# Patient Record
Sex: MALE | Race: WHITE | NOT HISPANIC OR LATINO | URBAN - METROPOLITAN AREA
[De-identification: names, ages, dates, MRNs, and addresses within clinical notes are randomized per-mention and may not be internally consistent; named-entity substitution may affect disease eponyms.]

---

## 2017-01-02 ENCOUNTER — COMMUNICATION - HEALTHEAST (OUTPATIENT)
Dept: FAMILY MEDICINE | Facility: CLINIC | Age: 57
End: 2017-01-02

## 2017-01-03 ENCOUNTER — OFFICE VISIT - HEALTHEAST (OUTPATIENT)
Dept: FAMILY MEDICINE | Facility: CLINIC | Age: 57
End: 2017-01-03

## 2017-01-03 DIAGNOSIS — I50.32 CHRONIC DIASTOLIC CONGESTIVE HEART FAILURE (H): ICD-10-CM

## 2017-01-03 DIAGNOSIS — I10 ESSENTIAL HYPERTENSION WITH GOAL BLOOD PRESSURE LESS THAN 140/90: ICD-10-CM

## 2017-01-03 ASSESSMENT — MIFFLIN-ST. JEOR: SCORE: 1930.3

## 2017-01-17 ENCOUNTER — OFFICE VISIT - HEALTHEAST (OUTPATIENT)
Dept: CARDIOLOGY | Facility: CLINIC | Age: 57
End: 2017-01-17

## 2017-01-17 ENCOUNTER — AMBULATORY - HEALTHEAST (OUTPATIENT)
Dept: CARDIOLOGY | Facility: CLINIC | Age: 57
End: 2017-01-17

## 2017-01-17 ENCOUNTER — OFFICE VISIT - HEALTHEAST (OUTPATIENT)
Dept: PULMONOLOGY | Facility: OTHER | Age: 57
End: 2017-01-17

## 2017-01-17 DIAGNOSIS — I50.32 CHRONIC DIASTOLIC CONGESTIVE HEART FAILURE (H): ICD-10-CM

## 2017-01-17 DIAGNOSIS — G47.30 SLEEP-DISORDERED BREATHING: ICD-10-CM

## 2017-01-17 DIAGNOSIS — I27.20 PULMONARY HYPERTENSION (H): ICD-10-CM

## 2017-01-17 RX ORDER — FUROSEMIDE 40 MG
40 TABLET ORAL
Qty: 180 TABLET | Refills: 3 | Status: SHIPPED | OUTPATIENT
Start: 2017-01-17 | End: 2017-04-17

## 2017-01-17 ASSESSMENT — MIFFLIN-ST. JEOR
SCORE: 1930.3
SCORE: 1921.23

## 2017-01-20 ENCOUNTER — AMBULATORY - HEALTHEAST (OUTPATIENT)
Dept: SLEEP MEDICINE | Facility: CLINIC | Age: 57
End: 2017-01-20

## 2017-02-12 ENCOUNTER — RECORDS - HEALTHEAST (OUTPATIENT)
Dept: ADMINISTRATIVE | Facility: OTHER | Age: 57
End: 2017-02-12

## 2017-02-12 ENCOUNTER — RECORDS - HEALTHEAST (OUTPATIENT)
Dept: SLEEP MEDICINE | Facility: CLINIC | Age: 57
End: 2017-02-12

## 2017-02-12 DIAGNOSIS — G47.30 SLEEP APNEA, UNSPECIFIED: ICD-10-CM

## 2017-02-13 ENCOUNTER — OFFICE VISIT - HEALTHEAST (OUTPATIENT)
Dept: CARDIOLOGY | Facility: CLINIC | Age: 57
End: 2017-02-13

## 2017-02-13 DIAGNOSIS — I50.32 CHRONIC DIASTOLIC CONGESTIVE HEART FAILURE (H): ICD-10-CM

## 2017-02-13 DIAGNOSIS — I27.20 PULMONARY HYPERTENSION (H): ICD-10-CM

## 2017-02-13 DIAGNOSIS — E11.9 TYPE 2 DIABETES MELLITUS (H): ICD-10-CM

## 2017-02-13 DIAGNOSIS — F17.200 TOBACCO USE DISORDER: ICD-10-CM

## 2017-02-13 DIAGNOSIS — I10 ESSENTIAL HYPERTENSION WITH GOAL BLOOD PRESSURE LESS THAN 140/90: ICD-10-CM

## 2017-02-13 DIAGNOSIS — J43.9 PULMONARY EMPHYSEMA, UNSPECIFIED EMPHYSEMA TYPE (H): ICD-10-CM

## 2017-02-13 DIAGNOSIS — E11.9 DIABETES (H): ICD-10-CM

## 2017-02-13 ASSESSMENT — MIFFLIN-ST. JEOR: SCORE: 1925.76

## 2017-02-27 ENCOUNTER — COMMUNICATION - HEALTHEAST (OUTPATIENT)
Dept: SCHEDULING | Facility: CLINIC | Age: 57
End: 2017-02-27

## 2017-02-28 ENCOUNTER — OFFICE VISIT - HEALTHEAST (OUTPATIENT)
Dept: PULMONOLOGY | Facility: OTHER | Age: 57
End: 2017-02-28

## 2017-02-28 DIAGNOSIS — I50.32 CHRONIC DIASTOLIC CONGESTIVE HEART FAILURE (H): ICD-10-CM

## 2017-02-28 DIAGNOSIS — J43.9 PULMONARY EMPHYSEMA, UNSPECIFIED EMPHYSEMA TYPE (H): ICD-10-CM

## 2017-02-28 DIAGNOSIS — I27.20 PULMONARY HYPERTENSION (H): ICD-10-CM

## 2017-02-28 DIAGNOSIS — J96.11 CHRONIC RESPIRATORY FAILURE WITH HYPOXIA (H): ICD-10-CM

## 2017-02-28 DIAGNOSIS — G47.33 OSA (OBSTRUCTIVE SLEEP APNEA): ICD-10-CM

## 2017-03-02 ENCOUNTER — COMMUNICATION - HEALTHEAST (OUTPATIENT)
Dept: FAMILY MEDICINE | Facility: CLINIC | Age: 57
End: 2017-03-02

## 2017-03-02 DIAGNOSIS — J44.89 CHRONIC AIRWAY OBSTRUCTION, NOT ELSEWHERE CLASSIFIED: ICD-10-CM

## 2017-03-06 ENCOUNTER — COMMUNICATION - HEALTHEAST (OUTPATIENT)
Dept: FAMILY MEDICINE | Facility: CLINIC | Age: 57
End: 2017-03-06

## 2017-03-06 DIAGNOSIS — E11.9 TYPE 2 DIABETES MELLITUS (H): ICD-10-CM

## 2017-03-09 ENCOUNTER — AMBULATORY - HEALTHEAST (OUTPATIENT)
Dept: PULMONOLOGY | Facility: OTHER | Age: 57
End: 2017-03-09

## 2017-03-09 ENCOUNTER — COMMUNICATION - HEALTHEAST (OUTPATIENT)
Dept: PULMONOLOGY | Facility: OTHER | Age: 57
End: 2017-03-09

## 2017-03-09 DIAGNOSIS — G47.33 OSA (OBSTRUCTIVE SLEEP APNEA): ICD-10-CM

## 2017-03-15 ENCOUNTER — COMMUNICATION - HEALTHEAST (OUTPATIENT)
Dept: PULMONOLOGY | Facility: OTHER | Age: 57
End: 2017-03-15

## 2017-03-16 ENCOUNTER — COMMUNICATION - HEALTHEAST (OUTPATIENT)
Dept: PULMONOLOGY | Facility: OTHER | Age: 57
End: 2017-03-16

## 2017-03-16 DIAGNOSIS — J43.9 PULMONARY EMPHYSEMA, UNSPECIFIED EMPHYSEMA TYPE (H): ICD-10-CM

## 2017-03-17 ENCOUNTER — OFFICE VISIT - HEALTHEAST (OUTPATIENT)
Dept: CARDIOLOGY | Facility: CLINIC | Age: 57
End: 2017-03-17

## 2017-03-17 DIAGNOSIS — I50.32 CHRONIC DIASTOLIC CONGESTIVE HEART FAILURE (H): ICD-10-CM

## 2017-03-17 ASSESSMENT — MIFFLIN-ST. JEOR: SCORE: 1930.3

## 2017-03-23 ENCOUNTER — AMBULATORY - HEALTHEAST (OUTPATIENT)
Dept: PULMONOLOGY | Facility: OTHER | Age: 57
End: 2017-03-23

## 2017-04-02 ENCOUNTER — RECORDS - HEALTHEAST (OUTPATIENT)
Dept: SLEEP MEDICINE | Age: 57
End: 2017-04-02

## 2017-04-02 DIAGNOSIS — G47.33 OBSTRUCTIVE SLEEP APNEA (ADULT) (PEDIATRIC): ICD-10-CM

## 2017-04-04 ENCOUNTER — AMBULATORY - HEALTHEAST (OUTPATIENT)
Dept: PULMONOLOGY | Facility: OTHER | Age: 57
End: 2017-04-04

## 2017-04-04 DIAGNOSIS — J43.9 EMPHYSEMA LUNG (H): ICD-10-CM

## 2017-04-18 ENCOUNTER — RECORDS - HEALTHEAST (OUTPATIENT)
Dept: ADMINISTRATIVE | Facility: OTHER | Age: 57
End: 2017-04-18

## 2017-04-18 ENCOUNTER — RECORDS - HEALTHEAST (OUTPATIENT)
Dept: PULMONOLOGY | Facility: OTHER | Age: 57
End: 2017-04-18

## 2017-04-18 ENCOUNTER — OFFICE VISIT - HEALTHEAST (OUTPATIENT)
Dept: PULMONOLOGY | Facility: OTHER | Age: 57
End: 2017-04-18

## 2017-04-18 DIAGNOSIS — Z72.0 TOBACCO USE: ICD-10-CM

## 2017-04-18 DIAGNOSIS — J43.9 PULMONARY EMPHYSEMA, UNSPECIFIED EMPHYSEMA TYPE (H): ICD-10-CM

## 2017-04-18 DIAGNOSIS — J96.11 CHRONIC RESPIRATORY FAILURE WITH HYPOXIA (H): ICD-10-CM

## 2017-04-18 DIAGNOSIS — J43.9 EMPHYSEMA, UNSPECIFIED (H): ICD-10-CM

## 2017-04-18 DIAGNOSIS — G47.33 OBSTRUCTIVE SLEEP APNEA: ICD-10-CM

## 2017-04-26 ENCOUNTER — AMBULATORY - HEALTHEAST (OUTPATIENT)
Dept: PULMONOLOGY | Facility: OTHER | Age: 57
End: 2017-04-26

## 2017-04-28 ENCOUNTER — OFFICE VISIT - HEALTHEAST (OUTPATIENT)
Dept: FAMILY MEDICINE | Facility: CLINIC | Age: 57
End: 2017-04-28

## 2017-04-28 DIAGNOSIS — G89.29 CHRONIC LEFT SHOULDER PAIN: ICD-10-CM

## 2017-04-28 DIAGNOSIS — E11.9 TYPE 2 DIABETES MELLITUS WITHOUT COMPLICATION, WITHOUT LONG-TERM CURRENT USE OF INSULIN (H): ICD-10-CM

## 2017-04-28 DIAGNOSIS — J43.9 PULMONARY EMPHYSEMA, UNSPECIFIED EMPHYSEMA TYPE (H): ICD-10-CM

## 2017-04-28 DIAGNOSIS — M25.512 CHRONIC LEFT SHOULDER PAIN: ICD-10-CM

## 2017-04-28 LAB — HBA1C MFR BLD: 5.5 % (ref 3.5–6)

## 2017-04-28 ASSESSMENT — MIFFLIN-ST. JEOR: SCORE: 1900.82

## 2017-05-04 ENCOUNTER — HOSPITAL ENCOUNTER (OUTPATIENT)
Dept: MRI IMAGING | Facility: HOSPITAL | Age: 57
Discharge: HOME | End: 2017-05-04
Attending: FAMILY MEDICINE

## 2017-05-04 ENCOUNTER — COMMUNICATION - HEALTHEAST (OUTPATIENT)
Dept: FAMILY MEDICINE | Facility: CLINIC | Age: 57
End: 2017-05-04

## 2017-05-04 DIAGNOSIS — G89.29 CHRONIC LEFT SHOULDER PAIN: ICD-10-CM

## 2017-05-04 DIAGNOSIS — J43.9 PULMONARY EMPHYSEMA, UNSPECIFIED EMPHYSEMA TYPE (H): ICD-10-CM

## 2017-05-04 DIAGNOSIS — M25.512 CHRONIC LEFT SHOULDER PAIN: ICD-10-CM

## 2017-05-16 ENCOUNTER — OFFICE VISIT - HEALTHEAST (OUTPATIENT)
Dept: FAMILY MEDICINE | Facility: CLINIC | Age: 57
End: 2017-05-16

## 2017-05-16 ASSESSMENT — MIFFLIN-ST. JEOR: SCORE: 1906.49

## 2017-05-22 ENCOUNTER — COMMUNICATION - HEALTHEAST (OUTPATIENT)
Dept: FAMILY MEDICINE | Facility: CLINIC | Age: 57
End: 2017-05-22

## 2017-05-22 DIAGNOSIS — J44.89 CHRONIC AIRWAY OBSTRUCTION, NOT ELSEWHERE CLASSIFIED: ICD-10-CM

## 2017-06-05 ENCOUNTER — RECORDS - HEALTHEAST (OUTPATIENT)
Dept: ADMINISTRATIVE | Facility: OTHER | Age: 57
End: 2017-06-05

## 2017-07-02 ENCOUNTER — COMMUNICATION - HEALTHEAST (OUTPATIENT)
Dept: FAMILY MEDICINE | Facility: CLINIC | Age: 57
End: 2017-07-02

## 2017-07-02 DIAGNOSIS — J44.89 CHRONIC AIRWAY OBSTRUCTION, NOT ELSEWHERE CLASSIFIED: ICD-10-CM

## 2017-07-03 ENCOUNTER — RECORDS - HEALTHEAST (OUTPATIENT)
Dept: ADMINISTRATIVE | Facility: OTHER | Age: 57
End: 2017-07-03

## 2017-08-10 ENCOUNTER — OFFICE VISIT - HEALTHEAST (OUTPATIENT)
Dept: PULMONOLOGY | Facility: OTHER | Age: 57
End: 2017-08-10

## 2017-08-10 DIAGNOSIS — G47.33 OBSTRUCTIVE SLEEP APNEA: ICD-10-CM

## 2017-08-10 DIAGNOSIS — J43.9 PULMONARY EMPHYSEMA, UNSPECIFIED EMPHYSEMA TYPE (H): ICD-10-CM

## 2017-08-10 DIAGNOSIS — R06.09 DYSPNEA ON EXERTION: ICD-10-CM

## 2017-08-10 DIAGNOSIS — J96.11 CHRONIC RESPIRATORY FAILURE WITH HYPOXIA (H): ICD-10-CM

## 2017-08-10 DIAGNOSIS — I50.32 CHRONIC DIASTOLIC CONGESTIVE HEART FAILURE (H): ICD-10-CM

## 2017-09-25 ENCOUNTER — COMMUNICATION - HEALTHEAST (OUTPATIENT)
Dept: FAMILY MEDICINE | Facility: CLINIC | Age: 57
End: 2017-09-25

## 2017-09-25 DIAGNOSIS — J44.89 CHRONIC AIRWAY OBSTRUCTION, NOT ELSEWHERE CLASSIFIED: ICD-10-CM

## 2017-10-17 ENCOUNTER — OFFICE VISIT - HEALTHEAST (OUTPATIENT)
Dept: FAMILY MEDICINE | Facility: CLINIC | Age: 57
End: 2017-10-17

## 2017-10-17 DIAGNOSIS — E11.9 TYPE 2 DIABETES MELLITUS WITHOUT COMPLICATION, WITHOUT LONG-TERM CURRENT USE OF INSULIN (H): ICD-10-CM

## 2017-10-17 DIAGNOSIS — I10 ESSENTIAL HYPERTENSION WITH GOAL BLOOD PRESSURE LESS THAN 140/90: ICD-10-CM

## 2017-10-17 DIAGNOSIS — Z23 NEED FOR IMMUNIZATION AGAINST INFLUENZA: ICD-10-CM

## 2017-10-17 LAB
CHOLEST SERPL-MCNC: 193 MG/DL
FASTING STATUS PATIENT QL REPORTED: YES
HBA1C MFR BLD: 5.9 % (ref 3.5–6)
HDLC SERPL-MCNC: 45 MG/DL
LDLC SERPL CALC-MCNC: 115 MG/DL
TRIGL SERPL-MCNC: 163 MG/DL

## 2017-10-17 ASSESSMENT — MIFFLIN-ST. JEOR: SCORE: 1901.95

## 2017-11-06 ENCOUNTER — COMMUNICATION - HEALTHEAST (OUTPATIENT)
Dept: FAMILY MEDICINE | Facility: CLINIC | Age: 57
End: 2017-11-06

## 2017-11-13 ENCOUNTER — COMMUNICATION - HEALTHEAST (OUTPATIENT)
Dept: FAMILY MEDICINE | Facility: CLINIC | Age: 57
End: 2017-11-13

## 2017-11-13 DIAGNOSIS — E11.9 TYPE 2 DIABETES MELLITUS (H): ICD-10-CM

## 2017-11-28 ENCOUNTER — OFFICE VISIT - HEALTHEAST (OUTPATIENT)
Dept: CARDIOLOGY | Facility: CLINIC | Age: 57
End: 2017-11-28

## 2017-11-28 DIAGNOSIS — J96.11 CHRONIC RESPIRATORY FAILURE WITH HYPOXIA (H): ICD-10-CM

## 2017-11-28 DIAGNOSIS — E11.9 TYPE 2 DIABETES MELLITUS WITHOUT COMPLICATION, WITHOUT LONG-TERM CURRENT USE OF INSULIN (H): ICD-10-CM

## 2017-11-28 DIAGNOSIS — J43.9 PULMONARY EMPHYSEMA, UNSPECIFIED EMPHYSEMA TYPE (H): ICD-10-CM

## 2017-11-28 DIAGNOSIS — I10 ESSENTIAL HYPERTENSION WITH GOAL BLOOD PRESSURE LESS THAN 140/90: ICD-10-CM

## 2017-11-28 DIAGNOSIS — G47.33 OBSTRUCTIVE SLEEP APNEA: ICD-10-CM

## 2017-11-28 DIAGNOSIS — I27.20 PULMONARY HYPERTENSION (H): ICD-10-CM

## 2017-11-28 DIAGNOSIS — I50.32 CHRONIC DIASTOLIC CONGESTIVE HEART FAILURE (H): ICD-10-CM

## 2017-11-28 ASSESSMENT — MIFFLIN-ST. JEOR: SCORE: 1862.26

## 2017-12-26 ENCOUNTER — COMMUNICATION - HEALTHEAST (OUTPATIENT)
Dept: FAMILY MEDICINE | Facility: CLINIC | Age: 57
End: 2017-12-26

## 2018-01-02 ENCOUNTER — RECORDS - HEALTHEAST (OUTPATIENT)
Dept: ADMINISTRATIVE | Facility: OTHER | Age: 58
End: 2018-01-02

## 2018-01-08 ENCOUNTER — COMMUNICATION - HEALTHEAST (OUTPATIENT)
Dept: CARDIOLOGY | Facility: CLINIC | Age: 58
End: 2018-01-08

## 2018-01-08 DIAGNOSIS — I50.32 CHRONIC DIASTOLIC CONGESTIVE HEART FAILURE (H): ICD-10-CM

## 2018-01-09 ENCOUNTER — RECORDS - HEALTHEAST (OUTPATIENT)
Dept: ADMINISTRATIVE | Facility: OTHER | Age: 58
End: 2018-01-09

## 2018-01-28 ENCOUNTER — COMMUNICATION - HEALTHEAST (OUTPATIENT)
Dept: FAMILY MEDICINE | Facility: CLINIC | Age: 58
End: 2018-01-28

## 2018-01-28 ENCOUNTER — COMMUNICATION - HEALTHEAST (OUTPATIENT)
Dept: PULMONOLOGY | Facility: OTHER | Age: 58
End: 2018-01-28

## 2018-01-28 DIAGNOSIS — I50.32 CHRONIC DIASTOLIC CONGESTIVE HEART FAILURE (H): ICD-10-CM

## 2018-01-28 DIAGNOSIS — I10 ESSENTIAL HYPERTENSION WITH GOAL BLOOD PRESSURE LESS THAN 140/90: ICD-10-CM

## 2018-02-27 ENCOUNTER — AMBULATORY - HEALTHEAST (OUTPATIENT)
Dept: PULMONOLOGY | Facility: OTHER | Age: 58
End: 2018-02-27

## 2018-02-27 ENCOUNTER — OFFICE VISIT - HEALTHEAST (OUTPATIENT)
Dept: PULMONOLOGY | Facility: OTHER | Age: 58
End: 2018-02-27

## 2018-02-27 DIAGNOSIS — J43.9 PULMONARY EMPHYSEMA, UNSPECIFIED EMPHYSEMA TYPE (H): ICD-10-CM

## 2018-02-27 DIAGNOSIS — G47.33 OBSTRUCTIVE SLEEP APNEA: ICD-10-CM

## 2018-02-27 DIAGNOSIS — J96.11 CHRONIC RESPIRATORY FAILURE WITH HYPOXIA (H): ICD-10-CM

## 2018-02-27 DIAGNOSIS — I27.20 PULMONARY HYPERTENSION (H): ICD-10-CM

## 2018-04-09 ENCOUNTER — COMMUNICATION - HEALTHEAST (OUTPATIENT)
Dept: FAMILY MEDICINE | Facility: CLINIC | Age: 58
End: 2018-04-09

## 2018-04-17 ENCOUNTER — OFFICE VISIT - HEALTHEAST (OUTPATIENT)
Dept: FAMILY MEDICINE | Facility: CLINIC | Age: 58
End: 2018-04-17

## 2018-04-17 DIAGNOSIS — E78.2 MIXED HYPERLIPIDEMIA: ICD-10-CM

## 2018-04-17 DIAGNOSIS — E11.9 TYPE 2 DIABETES MELLITUS WITHOUT COMPLICATION, WITHOUT LONG-TERM CURRENT USE OF INSULIN (H): ICD-10-CM

## 2018-04-17 LAB
HBA1C MFR BLD: 6 % (ref 3.5–6)
LDLC SERPL CALC-MCNC: 109 MG/DL

## 2018-04-17 ASSESSMENT — MIFFLIN-ST. JEOR: SCORE: 1857.73

## 2018-04-30 ENCOUNTER — AMBULATORY - HEALTHEAST (OUTPATIENT)
Dept: PULMONOLOGY | Facility: OTHER | Age: 58
End: 2018-04-30

## 2018-05-29 ENCOUNTER — COMMUNICATION - HEALTHEAST (OUTPATIENT)
Dept: FAMILY MEDICINE | Facility: CLINIC | Age: 58
End: 2018-05-29

## 2018-08-09 ENCOUNTER — COMMUNICATION - HEALTHEAST (OUTPATIENT)
Dept: ADMINISTRATIVE | Facility: CLINIC | Age: 58
End: 2018-08-09

## 2018-09-02 ENCOUNTER — COMMUNICATION - HEALTHEAST (OUTPATIENT)
Dept: PULMONOLOGY | Facility: OTHER | Age: 58
End: 2018-09-02

## 2018-09-02 DIAGNOSIS — J43.9 PULMONARY EMPHYSEMA, UNSPECIFIED EMPHYSEMA TYPE (H): ICD-10-CM

## 2018-10-19 ENCOUNTER — OFFICE VISIT - HEALTHEAST (OUTPATIENT)
Dept: FAMILY MEDICINE | Facility: CLINIC | Age: 58
End: 2018-10-19

## 2018-10-19 DIAGNOSIS — E11.9 TYPE 2 DIABETES MELLITUS WITHOUT COMPLICATION, WITHOUT LONG-TERM CURRENT USE OF INSULIN (H): ICD-10-CM

## 2018-10-19 DIAGNOSIS — Z23 NEED FOR VACCINATION: ICD-10-CM

## 2018-10-19 DIAGNOSIS — I10 ESSENTIAL HYPERTENSION WITH GOAL BLOOD PRESSURE LESS THAN 140/90: ICD-10-CM

## 2018-10-19 DIAGNOSIS — Z12.11 SCREEN FOR COLON CANCER: ICD-10-CM

## 2018-10-19 DIAGNOSIS — E78.2 MIXED HYPERLIPIDEMIA: ICD-10-CM

## 2018-10-19 LAB
ALBUMIN SERPL-MCNC: 4.3 G/DL (ref 3.5–5)
ALP SERPL-CCNC: 116 U/L (ref 45–120)
ALT SERPL W P-5'-P-CCNC: 17 U/L (ref 0–45)
ANION GAP SERPL CALCULATED.3IONS-SCNC: 11 MMOL/L (ref 5–18)
AST SERPL W P-5'-P-CCNC: 20 U/L (ref 0–40)
BILIRUB SERPL-MCNC: 1.2 MG/DL (ref 0–1)
BUN SERPL-MCNC: 18 MG/DL (ref 8–22)
CALCIUM SERPL-MCNC: 9.6 MG/DL (ref 8.5–10.5)
CHLORIDE BLD-SCNC: 93 MMOL/L (ref 98–107)
CHOLEST SERPL-MCNC: 169 MG/DL
CO2 SERPL-SCNC: 37 MMOL/L (ref 22–31)
CREAT SERPL-MCNC: 0.74 MG/DL (ref 0.7–1.3)
CREAT UR-MCNC: 141.1 MG/DL
FASTING STATUS PATIENT QL REPORTED: YES
GFR SERPL CREATININE-BSD FRML MDRD: >60 ML/MIN/1.73M2
GLUCOSE BLD-MCNC: 102 MG/DL (ref 70–125)
HBA1C MFR BLD: 5.9 % (ref 3.5–6)
HDLC SERPL-MCNC: 42 MG/DL
LDLC SERPL CALC-MCNC: 107 MG/DL
MICROALBUMIN UR-MCNC: 0.82 MG/DL (ref 0–1.99)
MICROALBUMIN/CREAT UR: 5.8 MG/G
POTASSIUM BLD-SCNC: 4.2 MMOL/L (ref 3.5–5)
PROT SERPL-MCNC: 7.6 G/DL (ref 6–8)
SODIUM SERPL-SCNC: 141 MMOL/L (ref 136–145)
TRIGL SERPL-MCNC: 98 MG/DL

## 2018-10-19 ASSESSMENT — MIFFLIN-ST. JEOR: SCORE: 1832.78

## 2018-10-26 ENCOUNTER — RECORDS - HEALTHEAST (OUTPATIENT)
Dept: ADMINISTRATIVE | Facility: OTHER | Age: 58
End: 2018-10-26

## 2018-11-08 ENCOUNTER — RECORDS - HEALTHEAST (OUTPATIENT)
Dept: ADMINISTRATIVE | Facility: OTHER | Age: 58
End: 2018-11-08

## 2018-11-08 LAB — COLOGUARD-ABSTRACT: NEGATIVE

## 2018-11-12 ENCOUNTER — COMMUNICATION - HEALTHEAST (OUTPATIENT)
Dept: FAMILY MEDICINE | Facility: CLINIC | Age: 58
End: 2018-11-12

## 2018-11-14 ENCOUNTER — COMMUNICATION - HEALTHEAST (OUTPATIENT)
Dept: FAMILY MEDICINE | Facility: CLINIC | Age: 58
End: 2018-11-14

## 2018-11-15 ENCOUNTER — COMMUNICATION - HEALTHEAST (OUTPATIENT)
Dept: SCHEDULING | Facility: CLINIC | Age: 58
End: 2018-11-15

## 2019-01-08 ENCOUNTER — RECORDS - HEALTHEAST (OUTPATIENT)
Dept: ADMINISTRATIVE | Facility: OTHER | Age: 59
End: 2019-01-08

## 2019-01-13 ENCOUNTER — COMMUNICATION - HEALTHEAST (OUTPATIENT)
Dept: CARDIOLOGY | Facility: CLINIC | Age: 59
End: 2019-01-13

## 2019-01-13 DIAGNOSIS — I50.32 CHRONIC DIASTOLIC CONGESTIVE HEART FAILURE (H): ICD-10-CM

## 2019-01-18 ENCOUNTER — COMMUNICATION - HEALTHEAST (OUTPATIENT)
Dept: PULMONOLOGY | Facility: OTHER | Age: 59
End: 2019-01-18

## 2019-01-18 DIAGNOSIS — J43.9 PULMONARY EMPHYSEMA, UNSPECIFIED EMPHYSEMA TYPE (H): ICD-10-CM

## 2019-01-21 ENCOUNTER — COMMUNICATION - HEALTHEAST (OUTPATIENT)
Dept: FAMILY MEDICINE | Facility: CLINIC | Age: 59
End: 2019-01-21

## 2019-01-21 DIAGNOSIS — I10 ESSENTIAL HYPERTENSION WITH GOAL BLOOD PRESSURE LESS THAN 140/90: ICD-10-CM

## 2019-02-12 ENCOUNTER — COMMUNICATION - HEALTHEAST (OUTPATIENT)
Dept: PULMONOLOGY | Facility: OTHER | Age: 59
End: 2019-02-12

## 2019-02-15 ENCOUNTER — COMMUNICATION - HEALTHEAST (OUTPATIENT)
Dept: FAMILY MEDICINE | Facility: CLINIC | Age: 59
End: 2019-02-15

## 2019-02-20 ENCOUNTER — AMBULATORY - HEALTHEAST (OUTPATIENT)
Dept: FAMILY MEDICINE | Facility: CLINIC | Age: 59
End: 2019-02-20

## 2019-04-04 ENCOUNTER — RECORDS - HEALTHEAST (OUTPATIENT)
Dept: ADMINISTRATIVE | Facility: OTHER | Age: 59
End: 2019-04-04

## 2019-04-15 ENCOUNTER — COMMUNICATION - HEALTHEAST (OUTPATIENT)
Dept: FAMILY MEDICINE | Facility: CLINIC | Age: 59
End: 2019-04-15

## 2019-04-19 ENCOUNTER — OFFICE VISIT - HEALTHEAST (OUTPATIENT)
Dept: FAMILY MEDICINE | Facility: CLINIC | Age: 59
End: 2019-04-19

## 2019-04-19 DIAGNOSIS — E11.9 TYPE 2 DIABETES MELLITUS WITHOUT COMPLICATION, WITHOUT LONG-TERM CURRENT USE OF INSULIN (H): ICD-10-CM

## 2019-04-19 LAB — HBA1C MFR BLD: 5.7 % (ref 3.5–6)

## 2019-04-19 ASSESSMENT — MIFFLIN-ST. JEOR: SCORE: 1841.85

## 2019-04-24 ENCOUNTER — RECORDS - HEALTHEAST (OUTPATIENT)
Dept: HEALTH INFORMATION MANAGEMENT | Facility: CLINIC | Age: 59
End: 2019-04-24

## 2019-05-01 ENCOUNTER — AMBULATORY - HEALTHEAST (OUTPATIENT)
Dept: OTHER | Facility: CLINIC | Age: 59
End: 2019-05-01

## 2019-05-02 ENCOUNTER — RECORDS - HEALTHEAST (OUTPATIENT)
Dept: HEALTH INFORMATION MANAGEMENT | Facility: CLINIC | Age: 59
End: 2019-05-02

## 2019-06-20 ENCOUNTER — COMMUNICATION - HEALTHEAST (OUTPATIENT)
Dept: FAMILY MEDICINE | Facility: CLINIC | Age: 59
End: 2019-06-20

## 2019-06-21 ENCOUNTER — OFFICE VISIT - HEALTHEAST (OUTPATIENT)
Dept: PHARMACY | Facility: CLINIC | Age: 59
End: 2019-06-21

## 2019-06-21 DIAGNOSIS — I10 ESSENTIAL HYPERTENSION WITH GOAL BLOOD PRESSURE LESS THAN 140/90: ICD-10-CM

## 2019-06-21 DIAGNOSIS — I50.32 CHRONIC DIASTOLIC CONGESTIVE HEART FAILURE (H): ICD-10-CM

## 2019-06-21 DIAGNOSIS — E11.9 TYPE 2 DIABETES MELLITUS WITHOUT COMPLICATION, WITHOUT LONG-TERM CURRENT USE OF INSULIN (H): ICD-10-CM

## 2019-06-21 DIAGNOSIS — J43.9 PULMONARY EMPHYSEMA, UNSPECIFIED EMPHYSEMA TYPE (H): ICD-10-CM

## 2019-07-11 ENCOUNTER — COMMUNICATION - HEALTHEAST (OUTPATIENT)
Dept: CARDIOLOGY | Facility: CLINIC | Age: 59
End: 2019-07-11

## 2019-07-11 ENCOUNTER — AMBULATORY - HEALTHEAST (OUTPATIENT)
Dept: FAMILY MEDICINE | Facility: CLINIC | Age: 59
End: 2019-07-11

## 2019-07-11 DIAGNOSIS — I50.32 CHRONIC DIASTOLIC CONGESTIVE HEART FAILURE (H): ICD-10-CM

## 2019-08-06 ENCOUNTER — OFFICE VISIT - HEALTHEAST (OUTPATIENT)
Dept: PHARMACY | Facility: CLINIC | Age: 59
End: 2019-08-06

## 2019-08-06 DIAGNOSIS — I10 ESSENTIAL HYPERTENSION WITH GOAL BLOOD PRESSURE LESS THAN 140/90: ICD-10-CM

## 2019-08-06 DIAGNOSIS — E11.9 TYPE 2 DIABETES MELLITUS WITHOUT COMPLICATION, WITHOUT LONG-TERM CURRENT USE OF INSULIN (H): ICD-10-CM

## 2019-08-06 DIAGNOSIS — J43.9 PULMONARY EMPHYSEMA, UNSPECIFIED EMPHYSEMA TYPE (H): ICD-10-CM

## 2019-08-06 DIAGNOSIS — I50.32 CHRONIC DIASTOLIC CONGESTIVE HEART FAILURE (H): ICD-10-CM

## 2019-09-05 ENCOUNTER — HOME CARE/HOSPICE - HEALTHEAST (OUTPATIENT)
Dept: HOME HEALTH SERVICES | Facility: HOME HEALTH | Age: 59
End: 2019-09-05

## 2019-09-06 ENCOUNTER — COMMUNICATION - HEALTHEAST (OUTPATIENT)
Dept: CARE COORDINATION | Facility: CLINIC | Age: 59
End: 2019-09-06

## 2019-09-08 ENCOUNTER — COMMUNICATION - HEALTHEAST (OUTPATIENT)
Dept: HOME HEALTH SERVICES | Facility: HOME HEALTH | Age: 59
End: 2019-09-08

## 2019-09-09 ENCOUNTER — OFFICE VISIT - HEALTHEAST (OUTPATIENT)
Dept: FAMILY MEDICINE | Facility: CLINIC | Age: 59
End: 2019-09-09

## 2019-09-09 DIAGNOSIS — E11.9 TYPE 2 DIABETES MELLITUS WITHOUT COMPLICATION, WITHOUT LONG-TERM CURRENT USE OF INSULIN (H): ICD-10-CM

## 2019-09-09 DIAGNOSIS — J96.21 ACUTE ON CHRONIC RESPIRATORY FAILURE WITH HYPOXIA AND HYPERCAPNIA (H): ICD-10-CM

## 2019-09-09 DIAGNOSIS — J96.22 ACUTE ON CHRONIC RESPIRATORY FAILURE WITH HYPOXIA AND HYPERCAPNIA (H): ICD-10-CM

## 2019-09-09 ASSESSMENT — MIFFLIN-ST. JEOR: SCORE: 1770.41

## 2019-09-12 ENCOUNTER — COMMUNICATION - HEALTHEAST (OUTPATIENT)
Dept: FAMILY MEDICINE | Facility: CLINIC | Age: 59
End: 2019-09-12

## 2019-10-24 ENCOUNTER — COMMUNICATION - HEALTHEAST (OUTPATIENT)
Dept: FAMILY MEDICINE | Facility: CLINIC | Age: 59
End: 2019-10-24

## 2019-10-24 DIAGNOSIS — I10 ESSENTIAL HYPERTENSION WITH GOAL BLOOD PRESSURE LESS THAN 140/90: ICD-10-CM

## 2019-11-06 ENCOUNTER — COMMUNICATION - HEALTHEAST (OUTPATIENT)
Dept: FAMILY MEDICINE | Facility: CLINIC | Age: 59
End: 2019-11-06

## 2019-11-07 ENCOUNTER — OFFICE VISIT - HEALTHEAST (OUTPATIENT)
Dept: PULMONOLOGY | Facility: OTHER | Age: 59
End: 2019-11-07

## 2019-11-07 DIAGNOSIS — R06.02 SHORTNESS OF BREATH: ICD-10-CM

## 2019-11-07 DIAGNOSIS — J43.2 CENTRILOBULAR EMPHYSEMA (H): ICD-10-CM

## 2019-11-07 DIAGNOSIS — G47.33 OSA (OBSTRUCTIVE SLEEP APNEA): ICD-10-CM

## 2019-11-07 DIAGNOSIS — J96.11 CHRONIC RESPIRATORY FAILURE WITH HYPOXIA (H): ICD-10-CM

## 2019-11-07 LAB — BNP SERPL-MCNC: <10 PG/ML (ref 0–49)

## 2019-11-07 ASSESSMENT — MIFFLIN-ST. JEOR: SCORE: 1794.22

## 2019-11-08 ENCOUNTER — AMBULATORY - HEALTHEAST (OUTPATIENT)
Dept: PULMONOLOGY | Facility: OTHER | Age: 59
End: 2019-11-08

## 2019-11-11 ENCOUNTER — AMBULATORY - HEALTHEAST (OUTPATIENT)
Dept: PULMONOLOGY | Facility: OTHER | Age: 59
End: 2019-11-11

## 2019-11-19 ENCOUNTER — OFFICE VISIT - HEALTHEAST (OUTPATIENT)
Dept: PHARMACY | Facility: CLINIC | Age: 59
End: 2019-11-19

## 2019-11-19 ENCOUNTER — AMBULATORY - HEALTHEAST (OUTPATIENT)
Dept: NURSING | Facility: CLINIC | Age: 59
End: 2019-11-19

## 2019-11-19 DIAGNOSIS — I50.32 CHRONIC DIASTOLIC CONGESTIVE HEART FAILURE (H): ICD-10-CM

## 2019-11-19 DIAGNOSIS — I10 ESSENTIAL HYPERTENSION WITH GOAL BLOOD PRESSURE LESS THAN 140/90: ICD-10-CM

## 2019-11-19 DIAGNOSIS — E11.9 TYPE 2 DIABETES MELLITUS WITHOUT COMPLICATION, WITHOUT LONG-TERM CURRENT USE OF INSULIN (H): ICD-10-CM

## 2019-11-19 DIAGNOSIS — Z23 IMMUNIZATION DUE: ICD-10-CM

## 2019-11-19 DIAGNOSIS — J43.9 PULMONARY EMPHYSEMA, UNSPECIFIED EMPHYSEMA TYPE (H): ICD-10-CM

## 2019-11-24 ENCOUNTER — COMMUNICATION - HEALTHEAST (OUTPATIENT)
Dept: PULMONOLOGY | Facility: OTHER | Age: 59
End: 2019-11-24

## 2019-11-24 DIAGNOSIS — J43.9 PULMONARY EMPHYSEMA, UNSPECIFIED EMPHYSEMA TYPE (H): ICD-10-CM

## 2019-12-13 ENCOUNTER — COMMUNICATION - HEALTHEAST (OUTPATIENT)
Dept: FAMILY MEDICINE | Facility: CLINIC | Age: 59
End: 2019-12-13

## 2019-12-13 DIAGNOSIS — J43.9 PULMONARY EMPHYSEMA, UNSPECIFIED EMPHYSEMA TYPE (H): ICD-10-CM

## 2020-01-01 ENCOUNTER — COMMUNICATION - HEALTHEAST (OUTPATIENT)
Dept: CARDIOLOGY | Facility: CLINIC | Age: 60
End: 2020-01-01

## 2020-01-01 ENCOUNTER — AMBULATORY - HEALTHEAST (OUTPATIENT)
Dept: OTHER | Facility: CLINIC | Age: 60
End: 2020-01-01

## 2020-01-01 ENCOUNTER — AMBULATORY - HEALTHEAST (OUTPATIENT)
Dept: PULMONOLOGY | Facility: OTHER | Age: 60
End: 2020-01-01

## 2020-01-01 ENCOUNTER — COMMUNICATION - HEALTHEAST (OUTPATIENT)
Dept: FAMILY MEDICINE | Facility: CLINIC | Age: 60
End: 2020-01-01

## 2020-01-01 ENCOUNTER — OFFICE VISIT - HEALTHEAST (OUTPATIENT)
Dept: PULMONOLOGY | Facility: OTHER | Age: 60
End: 2020-01-01

## 2020-01-01 ENCOUNTER — OFFICE VISIT - HEALTHEAST (OUTPATIENT)
Dept: FAMILY MEDICINE | Facility: CLINIC | Age: 60
End: 2020-01-01

## 2020-01-01 ENCOUNTER — RECORDS - HEALTHEAST (OUTPATIENT)
Dept: ADMINISTRATIVE | Facility: OTHER | Age: 60
End: 2020-01-01

## 2020-01-01 ENCOUNTER — COMMUNICATION - HEALTHEAST (OUTPATIENT)
Dept: PULMONOLOGY | Facility: OTHER | Age: 60
End: 2020-01-01

## 2020-01-01 ENCOUNTER — AMBULATORY - HEALTHEAST (OUTPATIENT)
Dept: PHARMACY | Facility: CLINIC | Age: 60
End: 2020-01-01

## 2020-01-01 ENCOUNTER — SURGERY - HEALTHEAST (OUTPATIENT)
Dept: CARDIOLOGY | Facility: CLINIC | Age: 60
End: 2020-01-01

## 2020-01-01 DIAGNOSIS — I10 ESSENTIAL HYPERTENSION WITH GOAL BLOOD PRESSURE LESS THAN 140/90: ICD-10-CM

## 2020-01-01 DIAGNOSIS — F17.200 TOBACCO USE DISORDER: ICD-10-CM

## 2020-01-01 DIAGNOSIS — E11.9 TYPE 2 DIABETES MELLITUS WITHOUT COMPLICATION, WITHOUT LONG-TERM CURRENT USE OF INSULIN (H): ICD-10-CM

## 2020-01-01 DIAGNOSIS — R09.02 HYPOXIA: ICD-10-CM

## 2020-01-01 DIAGNOSIS — I50.32 CHRONIC DIASTOLIC CONGESTIVE HEART FAILURE (H): ICD-10-CM

## 2020-01-01 DIAGNOSIS — J96.12 CHRONIC RESPIRATORY FAILURE WITH HYPOXIA AND HYPERCAPNIA (H): ICD-10-CM

## 2020-01-01 DIAGNOSIS — J43.9 PULMONARY EMPHYSEMA, UNSPECIFIED EMPHYSEMA TYPE (H): ICD-10-CM

## 2020-01-01 DIAGNOSIS — Z71.89 ADVANCED DIRECTIVES, COUNSELING/DISCUSSION: ICD-10-CM

## 2020-01-01 DIAGNOSIS — I50.32 CHRONIC DIASTOLIC HEART FAILURE WITH PRESERVED EJECTION FRACTION (H): ICD-10-CM

## 2020-01-01 DIAGNOSIS — J44.9 COPD (CHRONIC OBSTRUCTIVE PULMONARY DISEASE) (H): ICD-10-CM

## 2020-01-01 DIAGNOSIS — G47.33 OSA (OBSTRUCTIVE SLEEP APNEA): ICD-10-CM

## 2020-01-01 DIAGNOSIS — J96.11 CHRONIC RESPIRATORY FAILURE WITH HYPOXIA AND HYPERCAPNIA (H): ICD-10-CM

## 2020-01-01 DIAGNOSIS — E78.2 MIXED HYPERLIPIDEMIA: ICD-10-CM

## 2020-01-01 LAB
CHOLEST SERPL-MCNC: 165 MG/DL
CREAT UR-MCNC: 208 MG/DL
FASTING STATUS PATIENT QL REPORTED: YES
HBA1C MFR BLD: 5.6 % (ref 3.5–6)
HBA1C MFR BLD: 5.9 %
HDLC SERPL-MCNC: 45 MG/DL
LDLC SERPL CALC-MCNC: 108 MG/DL
MICROALBUMIN UR-MCNC: 1.47 MG/DL (ref 0–1.99)
MICROALBUMIN/CREAT UR: 7.1 MG/G
TRIGL SERPL-MCNC: 62 MG/DL

## 2020-01-01 RX ORDER — VARENICLINE TARTRATE 1 MG/1
TABLET, FILM COATED ORAL
Qty: 60 TABLET | Refills: 2 | Status: SHIPPED | OUTPATIENT
Start: 2020-01-01

## 2020-01-01 RX ORDER — AZITHROMYCIN 250 MG/1
TABLET, FILM COATED ORAL
Qty: 6 TABLET | Refills: 3 | Status: SHIPPED | OUTPATIENT
Start: 2020-01-01

## 2020-01-01 RX ORDER — FUROSEMIDE 40 MG
TABLET ORAL
Qty: 180 TABLET | Refills: 1 | Status: SHIPPED | OUTPATIENT
Start: 2020-01-01

## 2020-01-01 RX ORDER — ALBUTEROL SULFATE 90 UG/1
2 AEROSOL, METERED RESPIRATORY (INHALATION) EVERY 4 HOURS PRN
Qty: 6.7 G | Refills: 11 | Status: SHIPPED | OUTPATIENT
Start: 2020-01-01

## 2020-01-01 RX ORDER — PREDNISONE 20 MG/1
20 TABLET ORAL DAILY
Qty: 7 TABLET | Refills: 3 | Status: SHIPPED | OUTPATIENT
Start: 2020-01-01

## 2020-01-01 RX ORDER — METOPROLOL SUCCINATE 25 MG/1
TABLET, EXTENDED RELEASE ORAL
Qty: 90 TABLET | Refills: 2 | Status: SHIPPED | OUTPATIENT
Start: 2020-01-01

## 2020-01-01 RX ORDER — LOSARTAN POTASSIUM 25 MG/1
TABLET ORAL
Qty: 90 TABLET | Refills: 1 | Status: SHIPPED | OUTPATIENT
Start: 2020-01-01

## 2020-01-01 ASSESSMENT — MIFFLIN-ST. JEOR
SCORE: 1754.53
SCORE: 1721.65
SCORE: 1698.97
SCORE: 1726.18
SCORE: 1736.64

## 2020-01-14 ENCOUNTER — RECORDS - HEALTHEAST (OUTPATIENT)
Dept: ADMINISTRATIVE | Facility: OTHER | Age: 60
End: 2020-01-14

## 2020-01-14 LAB — RETINOPATHY: NEGATIVE

## 2020-01-20 ENCOUNTER — RECORDS - HEALTHEAST (OUTPATIENT)
Dept: HEALTH INFORMATION MANAGEMENT | Facility: CLINIC | Age: 60
End: 2020-01-20

## 2020-02-14 ENCOUNTER — OFFICE VISIT - HEALTHEAST (OUTPATIENT)
Dept: PULMONOLOGY | Facility: OTHER | Age: 60
End: 2020-02-14

## 2020-02-14 DIAGNOSIS — J43.9 PULMONARY EMPHYSEMA, UNSPECIFIED EMPHYSEMA TYPE (H): ICD-10-CM

## 2020-02-14 DIAGNOSIS — F17.210 CIGARETTE NICOTINE DEPENDENCE WITHOUT COMPLICATION: ICD-10-CM

## 2020-02-14 DIAGNOSIS — I27.20 PULMONARY HYPERTENSION (H): ICD-10-CM

## 2020-02-14 DIAGNOSIS — G47.33 OBSTRUCTIVE SLEEP APNEA: ICD-10-CM

## 2020-02-14 ASSESSMENT — MIFFLIN-ST. JEOR: SCORE: 1767.01

## 2020-03-02 ENCOUNTER — COMMUNICATION - HEALTHEAST (OUTPATIENT)
Dept: FAMILY MEDICINE | Facility: CLINIC | Age: 60
End: 2020-03-02

## 2020-03-02 DIAGNOSIS — I50.32 CHRONIC DIASTOLIC CONGESTIVE HEART FAILURE (H): ICD-10-CM

## 2021-01-01 ENCOUNTER — RECORDS - HEALTHEAST (OUTPATIENT)
Dept: ADMINISTRATIVE | Facility: OTHER | Age: 61
End: 2021-01-01

## 2021-01-01 ENCOUNTER — AMBULATORY - HEALTHEAST (OUTPATIENT)
Dept: FAMILY MEDICINE | Facility: CLINIC | Age: 61
End: 2021-01-01

## 2021-01-01 ENCOUNTER — AMBULATORY - HEALTHEAST (OUTPATIENT)
Dept: SURGERY | Facility: HOSPITAL | Age: 61
End: 2021-01-01

## 2021-01-01 ENCOUNTER — COMMUNICATION - HEALTHEAST (OUTPATIENT)
Dept: ADMINISTRATIVE | Facility: CLINIC | Age: 61
End: 2021-01-01

## 2021-01-01 ENCOUNTER — HOSPITAL ENCOUNTER (OUTPATIENT)
Dept: PET IMAGING | Facility: HOSPITAL | Age: 61
Discharge: HOME | End: 2021-02-18
Attending: INTERNAL MEDICINE

## 2021-01-01 ENCOUNTER — ANESTHESIA - HEALTHEAST (OUTPATIENT)
Dept: SURGERY | Facility: CLINIC | Age: 61
End: 2021-01-01

## 2021-01-01 ENCOUNTER — COMMUNICATION - HEALTHEAST (OUTPATIENT)
Dept: PULMONOLOGY | Facility: OTHER | Age: 61
End: 2021-01-01

## 2021-01-01 ENCOUNTER — COMMUNICATION - HEALTHEAST (OUTPATIENT)
Dept: FAMILY MEDICINE | Facility: CLINIC | Age: 61
End: 2021-01-01

## 2021-01-01 ENCOUNTER — OFFICE VISIT - HEALTHEAST (OUTPATIENT)
Dept: FAMILY MEDICINE | Facility: CLINIC | Age: 61
End: 2021-01-01

## 2021-01-01 ENCOUNTER — AMBULATORY - HEALTHEAST (OUTPATIENT)
Dept: PULMONOLOGY | Facility: OTHER | Age: 61
End: 2021-01-01

## 2021-01-01 ENCOUNTER — RECORDS - HEALTHEAST (OUTPATIENT)
Dept: RADIOLOGY | Facility: CLINIC | Age: 61
End: 2021-01-01

## 2021-01-01 ENCOUNTER — RECORDS - HEALTHEAST (OUTPATIENT)
Dept: HEALTH INFORMATION MANAGEMENT | Facility: CLINIC | Age: 61
End: 2021-01-01

## 2021-01-01 ENCOUNTER — COMMUNICATION - HEALTHEAST (OUTPATIENT)
Dept: SCHEDULING | Facility: CLINIC | Age: 61
End: 2021-01-01

## 2021-01-01 ENCOUNTER — OFFICE VISIT - HEALTHEAST (OUTPATIENT)
Dept: PULMONOLOGY | Facility: OTHER | Age: 61
End: 2021-01-01

## 2021-01-01 DIAGNOSIS — J96.11 CHRONIC RESPIRATORY FAILURE WITH HYPOXIA AND HYPERCAPNIA (H): ICD-10-CM

## 2021-01-01 DIAGNOSIS — J43.9 PULMONARY EMPHYSEMA, UNSPECIFIED EMPHYSEMA TYPE (H): ICD-10-CM

## 2021-01-01 DIAGNOSIS — I50.32 CHRONIC DIASTOLIC HEART FAILURE WITH PRESERVED EJECTION FRACTION (H): ICD-10-CM

## 2021-01-01 DIAGNOSIS — R59.0 MEDIASTINAL ADENOPATHY: ICD-10-CM

## 2021-01-01 DIAGNOSIS — J96.12 CHRONIC RESPIRATORY FAILURE WITH HYPOXIA AND HYPERCAPNIA (H): ICD-10-CM

## 2021-01-01 DIAGNOSIS — Z11.59 ENCOUNTER FOR SCREENING FOR OTHER VIRAL DISEASES: ICD-10-CM

## 2021-01-01 DIAGNOSIS — E11.9 TYPE 2 DIABETES MELLITUS WITHOUT COMPLICATION, WITHOUT LONG-TERM CURRENT USE OF INSULIN (H): ICD-10-CM

## 2021-01-01 DIAGNOSIS — R91.8 LUNG MASS: ICD-10-CM

## 2021-01-01 DIAGNOSIS — R59.0 MEDIASTINAL LYMPHADENOPATHY: ICD-10-CM

## 2021-01-01 LAB
GLUCOSE BLDC GLUCOMTR-MCNC: 135 MG/DL (ref 70–139)
RETINOPATHY: NEGATIVE
SARS-COV-2 PCR COMMENT: NORMAL
SARS-COV-2 RNA SPEC QL NAA+PROBE: NEGATIVE
SARS-COV-2 VIRUS SPECIMEN SOURCE: NORMAL

## 2021-01-01 RX ORDER — NICOTINE 21 MG/24HR
PATCH, TRANSDERMAL 24 HOURS TRANSDERMAL PRN
Status: SHIPPED | COMMUNITY
Start: 2021-01-01

## 2021-01-01 RX ORDER — TIOTROPIUM BROMIDE AND OLODATEROL 3.124; 2.736 UG/1; UG/1
SPRAY, METERED RESPIRATORY (INHALATION)
Qty: 4 G | Refills: 11 | Status: SHIPPED | OUTPATIENT
Start: 2021-01-01

## 2021-01-01 RX ORDER — ALBUTEROL SULFATE 0.83 MG/ML
SOLUTION RESPIRATORY (INHALATION)
Qty: 300 ML | Refills: 0 | Status: SHIPPED | OUTPATIENT
Start: 2021-01-01

## 2021-01-01 ASSESSMENT — MIFFLIN-ST. JEOR: SCORE: 1678.56

## 2021-04-27 ENCOUNTER — RECORDS - HEALTHEAST (OUTPATIENT)
Dept: ADMINISTRATIVE | Facility: OTHER | Age: 61
End: 2021-04-27

## 2021-05-28 ENCOUNTER — RECORDS - HEALTHEAST (OUTPATIENT)
Dept: ADMINISTRATIVE | Facility: CLINIC | Age: 61
End: 2021-05-28

## 2021-05-28 NOTE — PROGRESS NOTES
Assessment: /    Plan:    1. Type 2 diabetes mellitus without complication, without long-term current use of insulin (H)  Glycosylated Hemoglobin A1c   2. HCD (health care directive)         He brought a copy of his healthcare directive.  He would want CPR.  He states that if he were brain-dead, machines should not be used on a permanent basis.    LDL cholesterol is a little above goal of 100, and he prefers to not add a medication at this time.    Cologuard was negative.    Recheck diabetes in 6 months.      Subjective:    HPI:  Marlon Reddy is a 58-year-old male presenting for follow-up on diabetes.  He takes metformin.    LDL cholesterol was 107.      Social Hx: He is considering applying for Social Security disability because of his respiratory status.    Review of Systems: No fever or chest pain.      Current Outpatient Medications   Medication Sig Dispense Refill     albuterol (PROAIR HFA;PROVENTIL HFA;VENTOLIN HFA) 90 mcg/actuation inhaler Inhale 2 puffs every 4 (four) hours as needed for wheezing. 6.7 g 0     albuterol (PROVENTIL) 2.5 mg /3 mL (0.083 %) nebulizer solution USE 3 ML (2.5 MG TOTAL) IN NEBULIZER EVERY 4 HOURS AS NEEDED FOR WHEEZING 270 mL 11     aspirin 81 mg TbEF Take 81 mg by mouth once daily.        BLOOD SUGAR DIAGNOSTIC (TRUETEST TEST STRIPS MISC) USE 1 STRIP TWICE DAILY       furosemide (LASIX) 40 MG tablet Take 1 tablet (40 mg total) by mouth 2 (two) times a day. 60 tablet 11     INCRUSE ELLIPTA 62.5 mcg/actuation DsDv inhaler INHALE ONE PUFF BY MOUTH ONCE DAILY 30 each 6     losartan (COZAAR) 25 MG tablet Take 1 tablet (25 mg total) by mouth daily. 90 tablet 2     metFORMIN (GLUCOPHAGE) 500 MG tablet TAKE ONE TABLET BY MOUTH TWICE DAILY WITH MEALS (Patient taking differently: TAKE ONE TABLET BY MOUTH DAILY WITH MEALS) 180 tablet 1     metoprolol succinate (TOPROL-XL) 25 MG Take 1 tablet (25 mg total) by mouth daily. 90 tablet 1     naproxen (NAPROSYN) 500 MG tablet 1 tablet daily.        No current facility-administered medications for this visit.          Objective:    Vitals:    04/19/19 0900   BP: 124/72   Pulse: 88   Resp: 20   Temp: 98  F (36.7  C)   SpO2: 95%       Gen:  NAD, VSS  Using oxygen via nasal cannula  Lungs:  normal  Heart:  normal  Feet without ulcers, normal monofilament testing        ADDITIONAL HISTORY SUMMARIZED (2): None.  DECISION TO OBTAIN EXTRA INFORMATION (1): None.   RADIOLOGY TESTS (1): None.  LABS (1): A1c.  MEDICINE TESTS (1): None.  INDEPENDENT REVIEW (2 each): None.     Total Data Points: 1

## 2021-05-29 NOTE — PROGRESS NOTES
MTM Initial Encounter  Assessment & Plan                                                     1. Pulmonary emphysema  Needs improvement. Patient continues to use albuterol nebulizer solution more than prescribed which helps with his breathing. Per discussion with PCP, recommend that patient step up COPD therapy from LAMA to LAMA/LABA, continuing Ellipta inhaler if covered by insurance - demonstrates appropriate inhaler technique.  Patient to continue albuterol nebulizer use as needed for shortness of breath and coughing.  Could consider in the future DuoNeb if needed.  - umeclidinium-vilanterol (ANORO ELLIPTA) 62.5-25 mcg/actuation inhaler; Inhale 1 puff daily.  Dispense: 1 Inhaler; Refill: 6    2. Chronic diastolic congestive heart failure (H)  Needs improvement. Patient currently taking furosemide 40 mg once daily for the last week instead of twice daily as prescribed, without increased symptoms. Per discussion with PCP and patient, will continue once daily administration and close monitoring of symptoms including weight, exertional pain, fatigue, and shortness of breath. Additionally, due to nighttime urination, recommended patient take furosemide in the morning.   - furosemide (LASIX) 40 MG tablet; Take 1 tablet (40 mg total) by mouth daily.  Dispense: 30 tablet; Refill: 11    3. Essential hypertension with goal blood pressure less than 140/90  Stable. BP at goal of less than 140/90. Recommend continuation of current metoprolol and losartan.     4. Type 2 diabetes mellitus without complication, without long-term current use of insulin (H)  Stable. A1c at goal of less than 7% without medication therapy. Recommended patient schedule f/u if blood sugars elevate.      Follow Up  Return in about 7 weeks (around 8/6/2019) for Medication Review.    Subjective & Objective                                                     Gasper Reddy is a 58 y.o. male coming in for an initial visit for Medication Therapy  Management. He was referred to me from Dustin Mariano MD.     Chief Complaint: MTM f/u - wondering if he can cut down on furosemide, would like to wean off some.     Medication Adherence/Access: Self management - uses a pillbox, takes medications twice daily. Rarely forgets medication doses.     CHF/: Furosemide 40 mg twice daily around 9 am and 6 pm. This week he recently tried cutting back to 1 tablet at 6 pm due to pill burden and desire to take less medications. Denies any water retention in his calves/ankles, increased fatigue, pain or lightheadedness. Endorses waking in the night at least twice for urination.   ECHO 12/27/16: Ejection fraction 60%, also showed moderate to severe reduced right ventricle, mild to moderate right atrial enlargement, and elevated right atrial pressure  Wt Readings from Last 3 Encounters:   06/21/19 (!) 234 lb 12.8 oz (106.5 kg)   04/19/19 (!) 228 lb 8 oz (103.6 kg)   10/19/18 (!) 226 lb 8 oz (102.7 kg)     COPD: Incruse Ellipta (Umeclidinium) 1 puff daily and albuterol nebulizer solution as needed, more than every 4 hours. States that the neb solution works well for him. Thinks that his coughing may be slightly better. Denies nighttime awakening. Experiencing coughing pretty mucha all day long. Will cough up something up 3-4 times daily. Patient demonstrated appropriate inhaler technique today with Ellipta inhaler.     Hypertension: losartan 25 mg dailay, metoprolol succinate 25 mg daily. Sometimes notices lightheadedness with activity.   BP Readings from Last 3 Encounters:   06/21/19 128/72   04/19/19 124/72   10/19/18 116/60     Lab Results   Component Value Date    CREATININE 0.74 10/19/2018     Lab Results   Component Value Date    K 4.2 10/19/2018     Diabetes:  No longer taking metformin. Diet managed. Tries to do 20-30 minutes on a stationary bike daily.   SMBG: twice daily    Ranges (patient reported) : 130-160 in the morning and evening is   Patient is not  experiencing hypoglycemia   Recent symptoms of high blood sugar? None  Eye exam: up to date   Foot exam: up to date   ACEi/ARB:  Losartan 25 mg daily  Aspirin: taking 81mg daily, denies bleeding or burising   Lab Results   Component Value Date    HGBA1C 5.7 2019    HGBA1C 5.9 10/19/2018    HGBA1C 6.0 2018     Lab Results   Component Value Date    MICROALBUR 0.82 10/19/2018    LDLCALC 107 10/19/2018    CREATININE 0.74 10/19/2018     Headaches/Pain: aleve 220 mg as needed about once weekly.     PMH: reviewed in EPIC   Allergies/ADRs: reviewed in EPIC   Alcohol: reviewed in EPIC   Tobacco:   Social History     Tobacco Use   Smoking Status Former Smoker     Packs/day: 0.00     Types: Cigarettes     Start date: 2016     Last attempt to quit: 2016     Years since quittin.4   Smokeless Tobacco Never Used   Tobacco Comment    Was 1/ ppd     Today's Vitals:   Vitals:    19 0817 19 0821   BP: 132/72 128/72   Pulse: 90    SpO2: 93%    Weight: (!) 234 lb 12.8 oz (106.5 kg)      ----------------  The patient declined an after visit summary    I spent 60 minutes with this patient today.   All changes were made via collaborative practice agreement with Dustin Mariano MD. A copy of the visit note was provided to the patient's provider.     Sandie Hearn, PharmD  Medication Therapy Management Pharmacist  [facility]       Current Outpatient Medications   Medication Sig Dispense Refill     albuterol (PROAIR HFA;PROVENTIL HFA;VENTOLIN HFA) 90 mcg/actuation inhaler Inhale 2 puffs every 4 (four) hours as needed for wheezing. 6.7 g 0     albuterol (PROVENTIL) 2.5 mg /3 mL (0.083 %) nebulizer solution USE 3 ML (2.5 MG TOTAL) IN NEBULIZER EVERY 4 HOURS AS NEEDED FOR WHEEZING 270 mL 11     aspirin 81 mg TbEF Take 81 mg by mouth once daily.        BLOOD SUGAR DIAGNOSTIC (TRUETEST TEST STRIPS MISC) USE 1 STRIP TWICE DAILY       furosemide (LASIX) 40 MG tablet Take 1 tablet (40  mg total) by mouth daily. 30 tablet 11     losartan (COZAAR) 25 MG tablet Take 1 tablet (25 mg total) by mouth daily. 90 tablet 2     metoprolol succinate (TOPROL-XL) 25 MG Take 1 tablet (25 mg total) by mouth daily. 90 tablet 1     naproxen (NAPROSYN) 500 MG tablet 1 tablet daily.       umeclidinium-vilanterol (ANORO ELLIPTA) 62.5-25 mcg/actuation inhaler Inhale 1 puff daily. 1 Inhaler 6     No current facility-administered medications for this visit.

## 2021-05-30 VITALS — BODY MASS INDEX: 36.73 KG/M2 | HEIGHT: 69 IN | WEIGHT: 248 LBS

## 2021-05-30 VITALS — WEIGHT: 247 LBS | HEIGHT: 69 IN | BODY MASS INDEX: 36.58 KG/M2

## 2021-05-30 VITALS — WEIGHT: 250.4 LBS | BODY MASS INDEX: 36.98 KG/M2

## 2021-05-30 VITALS — BODY MASS INDEX: 35.88 KG/M2 | WEIGHT: 243 LBS

## 2021-05-30 VITALS — HEIGHT: 69 IN | WEIGHT: 241.5 LBS | BODY MASS INDEX: 35.77 KG/M2

## 2021-05-30 VITALS — WEIGHT: 246 LBS | HEIGHT: 69 IN | BODY MASS INDEX: 36.43 KG/M2

## 2021-05-30 VITALS — HEIGHT: 69 IN | BODY MASS INDEX: 36.73 KG/M2 | WEIGHT: 248 LBS

## 2021-05-31 VITALS — HEIGHT: 69 IN | WEIGHT: 241.75 LBS | BODY MASS INDEX: 35.81 KG/M2

## 2021-05-31 VITALS — BODY MASS INDEX: 35.74 KG/M2 | WEIGHT: 242 LBS

## 2021-05-31 VITALS — HEIGHT: 69 IN | BODY MASS INDEX: 35.95 KG/M2 | WEIGHT: 242.75 LBS

## 2021-05-31 VITALS — WEIGHT: 233 LBS | HEIGHT: 69 IN | BODY MASS INDEX: 34.51 KG/M2

## 2021-05-31 NOTE — PROGRESS NOTES
[facility] Follow Up Encounter  Assessment & Plan                                                     1. Pulmonary emphysema (H)  Needs change in inhaler per insurance. Improved management of COPD with Anoro Ellipta since last visit, although this inhaler is no longer going to be covered by his insurance. Will send new rx for Covered alternative, Stiolto today. Pharmacist reviewed appropriate inhaler technique and dosing of new inhaler, patient verbalized understanding. Recommended patient call if any issues with new inhaler.   - tiotropium-olodaterol (STIOLTO RESPIMAT) 2.5-2.5 mcg/actuation Mist inhaler; Inhale 2 Inhalation daily.  Dispense: 4 g; Refill: 3    2. Chronic diastolic congestive heart failure (H)  Stable. Patient currently well managed with once daily furosemide dosing. Patient aware that he should be monitoring his weight daily, has been monitoring well lately. Much improvement of nighttime urination from previously, recommended continuation of current therapy.      3. Essential hypertension with goal blood pressure less than 140/90  Stable. BP at goal of less than 140/90, recommend continuation of current therapy.     4. Type 2 diabetes mellitus without complication, without long-term current use of insulin (H)  Stable. A1c at goal of less than 7% and patient reported blood sugars are within goal. Recommend continuation of diet and exercise controlled diabetes management - patient admits that he is going to work on improving his exercise.     Follow Up  Return in about 15 weeks (around 11/19/2019) for Medication Review.    Subjective & Objective                                                     Gasper Reddy is a 59 y.o. male coming in for an follow up visit for Medication Therapy Management. He was referred to me from Dustin Mariano MD.     Chief Complaint: Follow Up from MT visit on 6/21/19    Medication Adherence/Access: Self management - uses a pillbox, takes medications twice daily. Rarely  forgets medication doses.     CHF/: Furosemide 40 mg once daily (previously was using twice daily). Been taking in the morning, nighttime awakening has reduced, now only 1, maybe twice nightly. Denies water retention, shortness of breath, or chest pain. Denies increased pillow count. Denies fatigue or pain. Used to weigh himself every day, has not been lately.   ECHO 12/27/16: Ejection fraction 60%, also showed moderate to severe reduced right ventricle, mild to moderate right atrial enlargement, and elevated right atrial pressure  Wt Readings from Last 3 Encounters:   08/06/19 (!) 230 lb 9.6 oz (104.6 kg)   06/21/19 (!) 234 lb 12.8 oz (106.5 kg)   04/19/19 (!) 228 lb 8 oz (103.6 kg)     COPD: Anoro Ellipta (Umeclidinium/Vilanterol) 1 puff daily and albuterol nebulizer solution as needed, been using 4-5 times a day. Previously was using albuterol neb 6+ times daily. Never uses the albuterol inhaler. Denies any increased symptoms, states that the new controller inhaler has been working very well for him. Though, brings in insurance letter stating that the Anoro Ellipta will no longer be covered, he has 7 days left of his Anoro. Per letter, covered alternative is Stiolto.     Hypertension: Taking losartan 25 mg dailay, metoprolol succinate 25 mg daily and furosemide 40 mg daily. Denies any dizziness or lightheadedness.   BP Readings from Last 3 Encounters:   08/06/19 124/64   06/21/19 128/72   04/19/19 124/72     Lab Results   Component Value Date    CREATININE 0.74 10/19/2018     Lab Results   Component Value Date    K 4.2 10/19/2018     Diabetes:  No longer taking metformin. Diet managed. Tries to do 20-30 minutes on a stationary bike daily, had stopped for a couple weeks but now back doing it.   SMBG: twice daily    Ranges (patient reported) : Highs 135, lows are 93  Patient is not experiencing hypoglycemia   Recent symptoms of high blood sugar? None  ACEi/ARB:  Losartan 25 mg daily  Aspirin: taking 81mg daily,  denies bleeding or burising   Diet: Diet has been ok, cheats once in while  Lab Results   Component Value Date    HGBA1C 5.7 2019    HGBA1C 5.9 10/19/2018    HGBA1C 6.0 2018     Lab Results   Component Value Date    MICROALBUR 0.82 10/19/2018    LDLCALC 107 10/19/2018    CREATININE 0.74 10/19/2018     Headaches/Pain: aleve 220 mg as needed about once weekly.     PMH: reviewed in EPIC   Allergies/ADRs: reviewed in EPIC   Alcohol: reviewed in EPIC   Tobacco:   Social History     Tobacco Use   Smoking Status Former Smoker     Packs/day: 0.00     Types: Cigarettes     Start date: 2016     Last attempt to quit: 2016     Years since quittin.6   Smokeless Tobacco Never Used   Tobacco Comment    Was  ppd     Today's Vitals:   Vitals:    19 0937   BP: 124/64   Pulse: 87   SpO2: 91%   Weight: (!) 230 lb 9.6 oz (104.6 kg)     ----------------  The patient was given a summary of these recommendations as an after visit summary    I spent 30 minutes with this patient today.   All changes were made via collaborative practice agreement with Dustin Mariano MD. A copy of the visit note was provided to the patient's provider.     Sandie Hearn, PharmD  Medication Therapy Management Pharmacist  [facility]       Current Outpatient Medications   Medication Sig Dispense Refill     albuterol (PROAIR HFA;PROVENTIL HFA;VENTOLIN HFA) 90 mcg/actuation inhaler Inhale 2 puffs every 4 (four) hours as needed for wheezing. 6.7 g 0     albuterol (PROVENTIL) 2.5 mg /3 mL (0.083 %) nebulizer solution USE 3 ML (2.5 MG TOTAL) IN NEBULIZER EVERY 4 HOURS AS NEEDED FOR WHEEZING 270 mL 11     aspirin 81 mg TbEF Take 81 mg by mouth once daily.        BLOOD SUGAR DIAGNOSTIC (TRUETEST TEST STRIPS MISC) USE 1 STRIP TWICE DAILY       furosemide (LASIX) 40 MG tablet Take 1 tablet (40 mg total) by mouth daily. 30 tablet 11     losartan (COZAAR) 25 MG tablet Take 1 tablet (25 mg total) by mouth daily.  90 tablet 2     metoprolol succinate (TOPROL-XL) 25 MG Take 1 tablet (25 mg total) by mouth daily. 90 tablet 3     naproxen (NAPROSYN) 500 MG tablet 1 tablet daily.       tiotropium-olodaterol (STIOLTO RESPIMAT) 2.5-2.5 mcg/actuation Mist inhaler Inhale 2 Inhalation daily. 4 g 3     No current facility-administered medications for this visit.

## 2021-06-01 VITALS — BODY MASS INDEX: 33.67 KG/M2 | WEIGHT: 228 LBS

## 2021-06-01 VITALS — WEIGHT: 232 LBS | HEIGHT: 69 IN | BODY MASS INDEX: 34.36 KG/M2

## 2021-06-01 NOTE — TELEPHONE ENCOUNTER
Who is calling:  [facility] RN case manager  Reason for Call:  FYI to provider.  [facility] If provider needs anything to help with the care of patient, please call.  Okay to leave a detailed message: Yes, if need.

## 2021-06-01 NOTE — TELEPHONE ENCOUNTER
We have been unable to contact patient and will be discarding this referral today. If you have any questions please feel free to contact [facility].

## 2021-06-01 NOTE — PROGRESS NOTES
TCM DISCHARGE FOLLOW UP CALL    Discharge Date:  9/5/2019  Reason for hospital stay (discharge diagnosis)::  COPD exacerbation, acute respiratory failure  Are you feeling better, the same or worse since your discharge?:  Patient is feeling better (Breathing is better, denies CP. Has ALVAREZ. Still has productive cough, sputum white. O2 on at 4L.  Pt doesn't have a flutter valve.)  Do you feel like you have a plan in the event of a health emergency?: Yes (brother)    As part of your discharge plan, were  home care services ordered for you?: Yes    Have you seen them yet, or are they scheduled to visit?: No    Did you receive any new medications, or was there a change to your medications?: Yes    Are you taking those medications, or do you have any established regiment?:  Pt finished azithromycin. Prednisone taper is down to 10 mg daily starting tomorrow. Uses albuterol nebs Q3-4 hours. Helps bring up secretions.  Do you have any follow up visits scheduled with your PCP or Specialist?:  Yes, with PCP  (RN) Is PCP appt scheduled soon enough (within 14 days of discharge date)?: Yes (9/9)

## 2021-06-01 NOTE — PROGRESS NOTES
Hospital discharge follow up call to pt, no answer.  Left VM message reminding pt of appt on 9/9.  RN will attempt call back at another time.

## 2021-06-01 NOTE — PROGRESS NOTES
Assessment: /    Plan:    1. Acute on chronic respiratory failure with hypoxia and hypercapnia (H)     2. Type 2 diabetes mellitus without complication, without long-term current use of insulin (H)         Acute on chronic respiratory failure, improving.  He will finish his prednisone taper.  He has pulmonary appointment October 23.    He stopped smoking again.    Diabetes, stable, controlled without medication.    I reconciled all of his medications.    Recheck in 6 months.      Subjective:    HPI:  Marlon Reddy is a 59-year-old male presenting for follow-up of hospitalization.  He was at [facility] September 2 to September 5 due to acute on chronic respiratory failure.  Respiratory virus panel was positive for rhinovirus.  Chest x-ray was negative.  EKG was negative.  He has been taking a Z-Landon.  Cough is productive of white sputum.  He is on a prednisone taper.    He has been using oxygen at 4 L during the daytime and 2 L at night with BiPAP.    A1c was 6.0, and fingerstick glucose readings have been 100 before he was on prednisone.      Social Hx: He was on a car trip with his brother on July 4.  His brother was smoking in the car, and the patient began smoking again.  He was smoking 1 pack/day, and now has not had any cigarettes for the past 8 days.  He is using nicotine lozenges.    Review of Systems: No fever or rash.      Current Outpatient Medications   Medication Sig Dispense Refill     albuterol (PROAIR HFA;PROVENTIL HFA;VENTOLIN HFA) 90 mcg/actuation inhaler Inhale 2 puffs every 4 (four) hours as needed for wheezing. 6.7 g 0     albuterol (PROVENTIL) 2.5 mg /3 mL (0.083 %) nebulizer solution USE 3 ML (2.5 MG TOTAL) IN NEBULIZER EVERY 4 HOURS AS NEEDED FOR WHEEZING 270 mL 11     aspirin 81 mg TbEF Take 81 mg by mouth once daily.        BLOOD SUGAR DIAGNOSTIC (TRUETEST TEST STRIPS MISC) USE 1 STRIP TWICE DAILY       furosemide (LASIX) 40 MG tablet Take 1 tablet (40 mg total) by mouth daily. 30 tablet 11      losartan (COZAAR) 25 MG tablet Take 1 tablet (25 mg total) by mouth daily. 90 tablet 2     metoprolol succinate (TOPROL-XL) 25 MG Take 1 tablet (25 mg total) by mouth daily. 90 tablet 3     multivitamin therapeutic tablet Take 1 tablet by mouth daily.       naproxen sodium (ALEVE) 220 MG tablet Take 220 mg by mouth 2 (two) times a day as needed for pain.       tiotropium-olodaterol (STIOLTO RESPIMAT) 2.5-2.5 mcg/actuation Mist inhaler Inhale 2 Inhalation daily. 4 g 3     No current facility-administered medications for this visit.          Objective:    Vitals:    09/09/19 1018   BP: 104/80   Pulse: 89   Resp: 20   Temp: 99  F (37.2  C)   SpO2: 94%       Gen:  NAD, VSS  Weight has decreased 16 pounds compared to April.  Lungs:  normal  Heart:  normal          ADDITIONAL HISTORY SUMMARIZED (2): Reviewed discharge summary and pulmonary notes.  DECISION TO OBTAIN EXTRA INFORMATION (1): None.   RADIOLOGY TESTS (1): Reviewed chest x-ray.  LABS (1): Reviewed A1c and other labs.  MEDICINE TESTS (1): Reviewed EKG.  INDEPENDENT REVIEW (2 each): None.     Total Data Points: 5

## 2021-06-02 VITALS — WEIGHT: 226.5 LBS | BODY MASS INDEX: 33.55 KG/M2 | HEIGHT: 69 IN

## 2021-06-02 NOTE — TELEPHONE ENCOUNTER
Refill Approved    Rx renewed per Medication Renewal Policy. Medication was last renewed on 1/23/19.    Danielle Carver, [facility] Triage/Med Refill 10/24/2019     Requested Prescriptions   Pending Prescriptions Disp Refills     losartan (COZAAR) 25 MG tablet [Pharmacy Med Name: LOSARTAN 25MG   TAB] 90 tablet 2     Sig: TAKE 1 TABLET BY MOUTH ONCE DAILY       Angiotensin Receptor Blocker Protocol Passed - 10/24/2019  4:22 PM        Passed - PCP or prescribing provider visit in past 12 months       Last office visit with prescriber/PCP: 9/9/2019 Dustin Mariano MD OR same dept: 9/9/2019 Dustin Mariano MD OR same specialty: 9/9/2019 Dustin Mariano MD  Last physical: Visit date not found Last MTM visit: Visit date not found   Next visit within 3 mo: Visit date not found  Next physical within 3 mo: Visit date not found  Prescriber OR PCP: Dustin Mariano MD  Last diagnosis associated with med order: 1. Essential hypertension with goal blood pressure less than 140/90  - losartan (COZAAR) 25 MG tablet [Pharmacy Med Name: LOSARTAN 25MG   TAB]; TAKE 1 TABLET BY MOUTH ONCE DAILY  Dispense: 90 tablet; Refill: 2    If protocol passes may refill for 12 months if within 3 months of last provider visit (or a total of 15 months).             Passed - Serum potassium within the past 12 months     Lab Results   Component Value Date    Potassium 4.2 09/05/2019             Passed - Blood pressure filed in past 12 months     BP Readings from Last 1 Encounters:   09/09/19 104/80             Passed - Serum creatinine within the past 12 months     Creatinine   Date Value Ref Range Status   09/05/2019 0.77 0.70 - 1.30 mg/dL Final

## 2021-06-03 VITALS
RESPIRATION RATE: 12 BRPM | HEART RATE: 98 BPM | OXYGEN SATURATION: 88 % | HEIGHT: 69 IN | SYSTOLIC BLOOD PRESSURE: 138 MMHG | DIASTOLIC BLOOD PRESSURE: 82 MMHG | WEIGHT: 218 LBS | BODY MASS INDEX: 32.29 KG/M2

## 2021-06-03 VITALS
OXYGEN SATURATION: 94 % | HEART RATE: 89 BPM | TEMPERATURE: 99 F | HEIGHT: 69 IN | BODY MASS INDEX: 31.51 KG/M2 | DIASTOLIC BLOOD PRESSURE: 80 MMHG | SYSTOLIC BLOOD PRESSURE: 104 MMHG | WEIGHT: 212.75 LBS | RESPIRATION RATE: 20 BRPM

## 2021-06-03 VITALS — BODY MASS INDEX: 34.05 KG/M2 | WEIGHT: 230.6 LBS

## 2021-06-03 VITALS — HEIGHT: 69 IN | WEIGHT: 228.5 LBS | BODY MASS INDEX: 33.84 KG/M2

## 2021-06-03 VITALS — WEIGHT: 234.8 LBS | BODY MASS INDEX: 34.67 KG/M2

## 2021-06-03 NOTE — PROGRESS NOTES
Orders for oxygen faxed to [facility] [place].  Patient current customer of [facility] [place].

## 2021-06-03 NOTE — PROGRESS NOTES
Assessment/Plan:        Diagnoses and all orders for this visit:    ART (obstructive sleep apnea)  -     Oximetry, overnight w/strip rcdr; Future    Centrilobular emphysema (H)  -     Alpha-1-Antitrypsin, Blood  -     Oxygen    Shortness of breath  -     BNP(B-type Natriuretic Peptide); Future  -     BNP(B-type Natriuretic Peptide)    Chronic respiratory failure with hypoxia (H)  -     Oxygen    59-year-old man with a history of likely pulmonary hypertension in the setting of significant obstructive sleep apnea, COPD and chronic hypoxic respiratory failure.  I am concerned that he seems to be getting a little bit worse.  It is possible his pulmonary hypertension could be worsened by hypoxemia at night.    Evaluate him for hypoxemia at night with pulse oximetry.  Check BNP.  Check alpha-1 antitrypsin level due to his severe emphysema at a relatively young age.    He was counseled for greater than 3 minutes on the importance of smoking cessation.  This included the benefits of smoking cessation, the risks of smoking, the importance of not smoking while on oxygen, and we discussed methods of initiating quitting including setting a date, eliminating triggers.  We discussed the importance of his brother quitting smoking as well.    He will continue his control inhalers for his COPD.    Obstructive sleep apnea and chronic respiratory failure-both worsened chronic problems  Emphysema and shortness of breath-stable problems    I certify that this patient, Gasper Reddy has been under my care and that I, or a nurse practitioner or physician's assistant working with me, had a face-to-face encounter that meets face-to-face encounter requirements with this patient on 11/7/2019.    Gasper Reddy is now in a chronic stable state and continues to require supplemental oxygen due to continued oxygen desaturation.  This patient has been treated in part, or in whole for the following medical condition(s): COPD J44.9  Treatments  tried and failed or ruled out to treat hypoxemia include inhalers, exercise.  If portability is ordered, is the patient mobile within the home? Yes          Subjective:    Patient ID: Gasper Reddy is a 59 y.o. male.    HPI57-year-old man with a history of emphysema, pulmonary hypertension, obstructive sleep apnea here for follow-up.  He had an episode of hypercarbic respiratory failure in the setting of rhinovirus pneumonia and hospitalization.  On admission he had a PCO2 of 125.  Reports that he was using his BiPAP prior to this.  No other clear provocative or palliative factors.  Of note he is still smoking occasionally.  He had quit previously.  No abdominal swelling, no leg swelling.  He is using his inhalers regularly and has not had any bronchitis or other issue.    He is historically been on 2 or 3 L/min at rest.  He seems to be using a little bit more now.    Obstructive sleep apnea: He wears his BiPAP every night.  He wears it for more than 4 hours every night.  It improves the quality of his sleep but not as much as before.  He notices that his sats are in the 60s when he wakes up in the morning.    Review of Systems  Review of systems: X12 systems it is negative except for positives listed above.        Objective:    Physical Exam   Constitutional: He is oriented to person, place, and time. He appears well-developed and well-nourished. No distress.   HENT:   Head: Normocephalic.   Nose: Nose normal.   Mouth/Throat: Oropharynx is clear and moist. No oropharyngeal exudate.   Eyes: Pupils are equal, round, and reactive to light. Right eye exhibits no discharge. Left eye exhibits no discharge. No scleral icterus.   Neck: Normal range of motion. No JVD present. No tracheal deviation present. No thyromegaly present.   Cardiovascular: Normal rate and regular rhythm. Exam reveals no gallop and no friction rub.   No murmur heard.  Pulmonary/Chest: Effort normal and breath sounds normal. No stridor. No  "respiratory distress. He has no wheezes. He has no rales.   Abdominal: Soft. Bowel sounds are normal. He exhibits no distension. There is no tenderness. Musculoskeletal:         General: No tenderness or edema.     Lymphadenopathy:     He has no cervical adenopathy.   Neurological: He is alert and oriented to person, place, and time. No cranial nerve deficit.   Skin: Skin is warm and dry. No rash noted. He is not diaphoretic. No erythema. No pallor.   Psychiatric: He has a normal mood and affect. His behavior is normal. Judgment and thought content normal.           Current Outpatient Medications on File Prior to Visit   Medication Sig Dispense Refill     albuterol (PROAIR HFA;PROVENTIL HFA;VENTOLIN HFA) 90 mcg/actuation inhaler Inhale 2 puffs every 4 (four) hours as needed for wheezing. 6.7 g 0     albuterol (PROVENTIL) 2.5 mg /3 mL (0.083 %) nebulizer solution USE 3 ML (2.5 MG TOTAL) IN NEBULIZER EVERY 4 HOURS AS NEEDED FOR WHEEZING 270 mL 11     aspirin 81 mg TbEF Take 81 mg by mouth once daily.        BLOOD SUGAR DIAGNOSTIC (TRUETEST TEST STRIPS MISC) USE 1 STRIP TWICE DAILY       furosemide (LASIX) 40 MG tablet Take 1 tablet (40 mg total) by mouth daily. 30 tablet 11     losartan (COZAAR) 25 MG tablet TAKE 1 TABLET BY MOUTH ONCE DAILY 90 tablet 3     metoprolol succinate (TOPROL-XL) 25 MG Take 1 tablet (25 mg total) by mouth daily. 90 tablet 3     multivitamin therapeutic tablet Take 1 tablet by mouth daily.       naproxen sodium (ALEVE) 220 MG tablet Take 220 mg by mouth 2 (two) times a day as needed for pain.       tiotropium-olodaterol (STIOLTO RESPIMAT) 2.5-2.5 mcg/actuation Mist inhaler Inhale 2 Inhalation daily. 4 g 3     No current facility-administered medications on file prior to visit.      /82   Pulse 98   Resp 12   Ht 5' 9\" (1.753 m)   Wt 218 lb (98.9 kg)   SpO2 (!) 88% Comment: 5 pulse dose  BMI 32.19 kg/m      Medical History  Active Ambulatory (Non-Hospital) Problems    Diagnosis     " Acute respiratory failure (H)     Acute on chronic respiratory failure with hypoxia and hypercapnia (H)     COPD exacerbation (H)     HCD (health care directive)     Mixed hyperlipidemia     Obstructive sleep apnea     Type 2 diabetes mellitus without complication, without long-term current use of insulin (H)     Chronic respiratory failure with hypoxia (H)     Chronic diastolic heart failure with preserved ejection fraction (H)     Essential hypertension with goal blood pressure less than 140/90     Pulmonary hypertension (H)     COPD (chronic obstructive pulmonary disease) (H)     Carpal Tunnel Syndrome     Medial Epicondylitis     Nicotine Dependence     Ileus     Diarrhea     Past Medical History:   Diagnosis Date     (HFpEF) heart failure with preserved ejection fraction (H)      COPD (chronic obstructive pulmonary disease) (H)      Diabetes mellitus (H)      Diverticulitis      Diverticulosis      HTN (hypertension)      Hypothyroid      ART (obstructive sleep apnea)      Pulmonary hypertension (H)      Small bowel obstruction (H)         Surgical History  He  has a past surgical history that includes Appendectomy; Colon surgery (04/2013); and Takedown ileostomy (05/2013).       Social History  Reviewed, he still living at home.  He is smoking sometimes   Allergies  No Known Allergies FAmily history reiewed: no changes today                          Data Review - imaging, labs, and ekgs listed below were reviewed by me.  Chest XRay and chest CT images and EKG tracings interpreted personally.     Past Labs  No visits with results within 2 Month(s) from this visit.   Latest known visit with results is:   Admission on 09/02/2019, Discharged on 09/05/2019   Component Date Value     Troponin I 09/02/2019 <0.01      Magnesium 09/02/2019 1.6*     Sodium 09/02/2019 143      Potassium 09/02/2019 4.7      Chloride 09/02/2019 92*     CO2 09/02/2019 42*     Anion Gap, Calculation 09/02/2019 9      Glucose 09/02/2019 145*      Calcium 09/02/2019 9.5      BUN 09/02/2019 14      Creatinine 09/02/2019 0.73      GFR MDRD Af Amer 09/02/2019 >60      GFR MDRD Non Af Amer 09/02/2019 >60      BNP 09/02/2019 17      WBC 09/02/2019 8.8      RBC 09/02/2019 4.52      Hemoglobin 09/02/2019 13.9*     Hematocrit 09/02/2019 44.5      MCV 09/02/2019 99      MCH 09/02/2019 30.8      MCHC 09/02/2019 31.2*     RDW 09/02/2019 12.4      Platelets 09/02/2019 208      MPV 09/02/2019 10.5      VENTRICULAR RATE 09/02/2019 97      ATRIAL RATE 09/02/2019 97      P-R INTERVAL 09/02/2019 184      QRS DURATION 09/02/2019 94      Q-T INTERVAL 09/02/2019 334      QTC CALCULATION (BEZET) 09/02/2019 424      P Axis 09/02/2019 79      R AXIS 09/02/2019 51      T AXIS 09/02/2019 67      MUSE DIAGNOSIS 09/02/2019                      Value:** Poor data quality, interpretation may be adversely affected  Normal sinus rhythm  Normal ECG  When compared with ECG of 27-DEC-2016 08:18,  No significant change was found  Confirmed by JAYLA STEARNS MD LOC: ([id number]) on 9/3/2019 2:22:49 PM       pH, Arterial 09/02/2019 7.19*     pCO2, Arterial 09/02/2019 >125*     pO2, Arterial 09/02/2019 118*     Bicarbonate, Arterial Ca* 09/02/2019 40.7*     O2 Sat, Arterial 09/02/2019 98.3*     Oxyhemoglobin 09/02/2019 93.4*     Base Excess, Arterial Ca* 09/02/2019 20.4      Ventilation Mode 09/02/2019 Nasal cannula      Flow 09/02/2019 6.0      Sample Stabilized Temper* 09/02/2019 37.0      Lactic Acid 09/02/2019 0.4*     pH, Arterial 09/02/2019 7.28*     pCO2, Arterial 09/02/2019 122*     pO2, Arterial 09/02/2019 60*     Bicarbonate, Arterial Ca* 09/02/2019 43.0*     O2 Sat, Arterial 09/02/2019 96.4      Oxyhemoglobin 09/02/2019 91.7*     Base Excess, Arterial Ca* 09/02/2019 23.4      Ventilation Mode 09/02/2019 Bi-PAP      Rate 09/02/2019 20      FIO2 09/02/2019 0.35      Peep 09/02/2019 5      Sample Stabilized Temper* 09/02/2019 37.0      Ventilator Tidal Volume 09/02/2019 400       Glucose 09/02/2019 149*     Sodium 09/03/2019 141      Potassium 09/03/2019 4.7      Chloride 09/03/2019 90*     CO2 09/03/2019 41*     Anion Gap, Calculation 09/03/2019 10      Glucose 09/03/2019 144*     Calcium 09/03/2019 9.6      BUN 09/03/2019 14      Creatinine 09/03/2019 0.72      GFR MDRD Af Amer 09/03/2019 >60      GFR MDRD Non Af Amer 09/03/2019 >60      WBC 09/03/2019 6.6      RBC 09/03/2019 4.33*     Hemoglobin 09/03/2019 13.4*     Hematocrit 09/03/2019 42.6      MCV 09/03/2019 98      MCH 09/03/2019 30.9      MCHC 09/03/2019 31.5*     RDW 09/03/2019 12.3      Platelets 09/03/2019 188      MPV 09/03/2019 10.3      Glucose 09/03/2019 150*     Glucose 09/03/2019 138      Magnesium 09/03/2019 2.7*     Glucose 09/03/2019 157*     Influenza A 09/03/2019 Negative      Influenza A, H1 09/03/2019 Negative      Influenza A, H3 09/03/2019 Negative      Influenza A 2009-H1 N1 09/03/2019 Negative      Influenza B 09/03/2019 Negative      Resp Syncytial Virus A 09/03/2019 Negative      Resp Syncytial Virus B 09/03/2019 Negative      Parainfluenza Virus 1 09/03/2019 Negative      Parainfluenza Virus 2 09/03/2019 Negative      Parainfluenza Virus 3 09/03/2019 Negative      Human Metapneumovirus 09/03/2019 Negative      Human Rhinovirus 09/03/2019 Positive*     Adenovirus species B/E 09/03/2019 Negative      Adenovirus species C 09/03/2019 Negative      Resp Virus Source 09/03/2019 Nasopharyngeal      Resp Virus Comment 09/03/2019 See Comment Below      Glucose 09/03/2019 158*     Glucose 09/03/2019 136      Glucose 09/03/2019 127      Magnesium 09/04/2019 1.9      Sodium 09/04/2019 141      Potassium 09/04/2019 4.6      Chloride 09/04/2019 89*     CO2 09/04/2019 44*     Anion Gap, Calculation 09/04/2019 8      Glucose 09/04/2019 145*     Calcium 09/04/2019 9.7      BUN 09/04/2019 17      Creatinine 09/04/2019 0.71      GFR MDRD Af Amer 09/04/2019 >60      GFR MDRD Non Af Amer 09/04/2019 >60      Hemoglobin A1c  09/04/2019 6.0      Platelets 09/04/2019 181      Glucose 09/04/2019 151*     Glucose 09/04/2019 131      Glucose 09/04/2019 135      Glucose 09/04/2019 163*     Sodium 09/05/2019 143      Potassium 09/05/2019 4.2      Chloride 09/05/2019 89*     CO2 09/05/2019 46*     Anion Gap, Calculation 09/05/2019 8      Glucose 09/05/2019 126*     Calcium 09/05/2019 9.9      BUN 09/05/2019 19      Creatinine 09/05/2019 0.77      GFR MDRD Af Amer 09/05/2019 >60      GFR MDRD Non Af Amer 09/05/2019 >60      Hemoglobin 09/05/2019 12.9*     Magnesium 09/05/2019 1.8      Glucose 09/05/2019 102      CXR reviewed and interpreted by me: no infiltrates, no effusion.    Past echocardiogram result reviewed by me  1. Normal left ventricular size and systolic performance with a visually estimated ejection fraction of 60%.  2. There is mild concentric increase in left ventricular wall thickness.  3. No significant valvular heart disease is identified in this study.   4. There is moderate right ventricular enlargement. Right ventricular systolic performance is moderate to severely reduced.  5. There is mild to moderate right atrial enlargement.  6. Right ventricular systolic pressure cannot be directly estimated from TR velocities due to insufficient tricuspid regurgitation.   7. The IVC appears dilated with decreased phasic variation in caval diameter consistent with elevated right atrial pressure.

## 2021-06-03 NOTE — TELEPHONE ENCOUNTER
FYI - Status Update  Who is Calling: Reema -   Update: Patients case management services will be closed as of today. Patient has stopped responding to them.   Okay to leave a detailed message?:  Yes

## 2021-06-03 NOTE — PROGRESS NOTES
[facility] Follow Up Encounter  Assessment & Plan                                                     1. Pulmonary emphysema  Well managed at this time. Continues to use controller Stioloto inhaler as prescribed daily as well as BiPAP and oxygen. Recommend continued follow up with pulmonology as instructed.   Next visit: reassess tobacco use    2. Chronic diastolic congestive heart failure (H)  Stable. Taking ARB, BB, and furosemide as prescribed, without medication side effects. Recommend continuation of all medications.   - Patient to contact PCP if noting weight gain or increased symptoms of CHF    3. Essential hypertension with goal blood pressure less than 140/90  Stable. BP at goal of less than 140/90, recommend continuation of current therapy.     4. Type 2 diabetes mellitus without complication, without long-term current use of insulin (H)  Stable. No current therapy needed. Last A1c at goal of less than 7%, recommend follow up A1c in March, 2020.     5. Immunization due  Needs improvement. Has not yet received flu shot this year.   - Influenza vaccine today at [facility]    Follow Up  Return in about 20 weeks (around 4/7/2020) for Medication Review.    Subjective & Objective                                                     Gasper Reddy is a 59 y.o. male coming in for an follow up visit for Medication Therapy Management. He was referred to me from Dustin Mariano MD.     Chief Complaint: Follow Up from [facility] visit on 8/6/19    Medication Adherence/Access: Self management - uses a pillbox, takes medications twice daily. Rarely forgets medication doses.     CHF: Furosemide 40 mg once daily. Been taking in the morning, nighttime awakening has reduced, now only 1, maybe twice nightly. Denies water retention, shortness of breath, or chest pain. Denies increased pillow count. Denies fatigue or pain. Used to weigh himself every day, has not been lately - states he checks every few days.   ECHO 12/27/16: Ejection  fraction 60%, also showed moderate to severe reduced right ventricle, mild to moderate right atrial enlargement, and elevated right atrial pressure  Wt Readings from Last 3 Encounters:   11/19/19 220 lb (99.8 kg)   11/07/19 218 lb (98.9 kg)   09/09/19 212 lb 12 oz (96.5 kg)     COPD: Stiolto 2 puffs two times a day. States that the albuterol inhaler did not previously work for him. Using Bipap at night. Been needing up to 3-4L of oxygen. States that his lung function has been fairly well managed lately. Was hospitalized in September due to infection for which he was treated with antibiotics. Unable to discuss smoking status today. Saw pulmonology recently, smoking cessation was discussed in great detail at this visit.     Hypertension: Taking losartan 25 mg daily, metoprolol succinate 25 mg daily and furosemide 40 mg daily. Denies any dizziness or lightheadedness.   BP Readings from Last 3 Encounters:   11/19/19 114/70   11/07/19 138/82   09/09/19 104/80     Lab Results   Component Value Date    CREATININE 0.77 09/05/2019     Lab Results   Component Value Date    K 4.2 09/05/2019     Diabetes:  No longer taking metformin. Diet managed.   SMBG: twice daily    Ranges (patient reported) : Highs 135, lows are 93  Patient is not experiencing hypoglycemia   ACEi/ARB:  Losartan 25 mg daily  Aspirin: taking 81mg daily, denies bleeding or burising   Diet: Diet has been ok, cheats once in while  Lab Results   Component Value Date    HGBA1C 6.0 09/04/2019    HGBA1C 5.7 04/19/2019    HGBA1C 5.9 10/19/2018     Lab Results   Component Value Date    MICROALBUR 0.82 10/19/2018    LDLCALC 107 10/19/2018    CREATININE 0.77 09/05/2019     Immunizations: Has not yet received his flu shot this year and is agreeable to get the flu shot today.     PMH: reviewed in EPIC   Allergies/ADRs: reviewed in EPIC   Alcohol: reviewed in EPIC   Tobacco:   Social History     Tobacco Use   Smoking Status Former Smoker     Packs/day: 0.00     Types:  Cigarettes     Last attempt to quit: 2016     Years since quittin.8   Smokeless Tobacco Never Used   Tobacco Comment    Was  ppd     Today's Vitals:   Vitals:    19 0909   BP: 114/70   Pulse: 87   SpO2: 95%   Weight: 220 lb (99.8 kg)     ----------------  The patient was given a summary of these recommendations as an after visit summary    I spent 30 minutes with this patient today.   All changes were made via collaborative practice agreement with Dustin Mariano MD. A copy of the visit note was provided to the patient's provider.     Sandie Hearn, PharmD  Medication Therapy Management Pharmacist  [facility]       Current Outpatient Medications   Medication Sig Dispense Refill     albuterol (PROAIR HFA;PROVENTIL HFA;VENTOLIN HFA) 90 mcg/actuation inhaler Inhale 2 puffs every 4 (four) hours as needed for wheezing. 6.7 g 0     albuterol (PROVENTIL) 2.5 mg /3 mL (0.083 %) nebulizer solution USE 3 ML (2.5 MG TOTAL) IN NEBULIZER EVERY 4 HOURS AS NEEDED FOR WHEEZING 270 mL 11     aspirin 81 mg TbEF Take 81 mg by mouth once daily.        BLOOD SUGAR DIAGNOSTIC (TRUETEST TEST STRIPS MISC) USE 1 STRIP TWICE DAILY       furosemide (LASIX) 40 MG tablet Take 1 tablet (40 mg total) by mouth daily. 30 tablet 11     losartan (COZAAR) 25 MG tablet TAKE 1 TABLET BY MOUTH ONCE DAILY 90 tablet 3     metoprolol succinate (TOPROL-XL) 25 MG Take 1 tablet (25 mg total) by mouth daily. 90 tablet 3     multivitamin therapeutic tablet Take 1 tablet by mouth daily.       naproxen sodium (ALEVE) 220 MG tablet Take 220 mg by mouth 2 (two) times a day as needed for pain.       tiotropium-olodaterol (STIOLTO RESPIMAT) 2.5-2.5 mcg/actuation Mist inhaler Inhale 2 Inhalation daily. 4 g 3     No current facility-administered medications for this visit.

## 2021-06-03 NOTE — TELEPHONE ENCOUNTER
ABY called the patient at phone number listed in chart to follow-up and notify him regarding remarks below. No answer, ABY left message for patient notifying him that Reema is trying to reach him and gave him her phone number. Thanks.

## 2021-06-04 VITALS
BODY MASS INDEX: 31.4 KG/M2 | DIASTOLIC BLOOD PRESSURE: 70 MMHG | WEIGHT: 212 LBS | HEART RATE: 98 BPM | OXYGEN SATURATION: 90 % | HEIGHT: 69 IN | SYSTOLIC BLOOD PRESSURE: 130 MMHG

## 2021-06-04 VITALS
OXYGEN SATURATION: 98 % | DIASTOLIC BLOOD PRESSURE: 70 MMHG | BODY MASS INDEX: 33.27 KG/M2 | WEIGHT: 212 LBS | TEMPERATURE: 98.2 F | HEART RATE: 79 BPM | SYSTOLIC BLOOD PRESSURE: 136 MMHG | HEIGHT: 67 IN

## 2021-06-04 VITALS
SYSTOLIC BLOOD PRESSURE: 130 MMHG | BODY MASS INDEX: 32.96 KG/M2 | WEIGHT: 210 LBS | OXYGEN SATURATION: 94 % | DIASTOLIC BLOOD PRESSURE: 70 MMHG | HEART RATE: 80 BPM | HEIGHT: 67 IN

## 2021-06-04 VITALS
BODY MASS INDEX: 32.49 KG/M2 | SYSTOLIC BLOOD PRESSURE: 114 MMHG | OXYGEN SATURATION: 95 % | HEART RATE: 87 BPM | WEIGHT: 220 LBS | DIASTOLIC BLOOD PRESSURE: 70 MMHG

## 2021-06-04 VITALS — WEIGHT: 209.25 LBS | HEIGHT: 69 IN | BODY MASS INDEX: 30.99 KG/M2

## 2021-06-04 NOTE — TELEPHONE ENCOUNTER
RN cannot approve Refill Request    RN can NOT refill this medication med is not covered by policy/route to provider. Last office visit: 9/9/2019 Dustin Mariano MD Last Physical: Visit date not found Last MTM visit: Visit date not found Last visit same specialty: 9/9/2019 Dustin Mariano MD.  Next visit within 3 mo: Visit date not found  Next physical within 3 mo: Visit date not found      Sofie Putnam, [facility] Triage/Med Refill 12/13/2019    Requested Prescriptions   Pending Prescriptions Disp Refills     STIOLTO RESPIMAT 2.5-2.5 mcg/actuation Mist inhaler [Pharmacy Med Name: STIOLTO RESPIMAT 30 DAY  AER] 4 g 0     Sig: INHALE 2 PUFFS INTO LUNGS ONCE DAILY       There is no refill protocol information for this order

## 2021-06-05 VITALS
OXYGEN SATURATION: 99 % | WEIGHT: 209.25 LBS | DIASTOLIC BLOOD PRESSURE: 68 MMHG | BODY MASS INDEX: 32.84 KG/M2 | RESPIRATION RATE: 24 BRPM | TEMPERATURE: 98.6 F | HEIGHT: 67 IN | HEART RATE: 82 BPM | SYSTOLIC BLOOD PRESSURE: 126 MMHG

## 2021-06-05 VITALS
DIASTOLIC BLOOD PRESSURE: 64 MMHG | TEMPERATURE: 98.6 F | HEIGHT: 67 IN | BODY MASS INDEX: 31.31 KG/M2 | WEIGHT: 199.5 LBS | SYSTOLIC BLOOD PRESSURE: 130 MMHG | HEART RATE: 94 BPM | OXYGEN SATURATION: 98 %

## 2021-06-05 VITALS — HEIGHT: 67 IN | BODY MASS INDEX: 32.02 KG/M2 | WEIGHT: 204 LBS

## 2021-06-05 VITALS — WEIGHT: 200 LBS | BODY MASS INDEX: 31.32 KG/M2

## 2021-06-06 NOTE — TELEPHONE ENCOUNTER
Gasper Reddy  [address] [facility] [address]  [phone] (home)     Procedure cardiologist:  Dr. Overton   PCP:  Dustin Mariano MD  H&P completed by:  2/14  Admit date 3/11  Arrival time:  0730  Anticoagulation: None  Bypass Grafts: No  Renal Issues: No  Diabetic?: No  Device?: No      Angiogram Teaching    Reason for Visit:  Telephone call to discuss pre-procedure education in preparation for: Right Heart Cath    Procedure Prep:  Primary Cardiologist note dated: N/A  EKG results obtained, dated: Morning of procedure  Hemogram results obtained: Morning of procedure  Basic Metabolic Panel results obtained: Morning of procedure  Lipid Profile results obtained: 10/19/18    Patient Education  Patient states understanding of procedure and risks and agrees to proceed.    Pre-procedure instructions  Patient instructed to be NPO after midnight.  Patient instructed to shower the evening before or the morning of the procedure.  Patient instructed to arrange for transportation home following procedure from a responsible family member of friend. No driving for at least 24 hours.  Patient understands to have their  () at the hospital by 9am the next day for discharge instructions and discharge ride.  Patient instructed to have a responsible adult with them for 24 hours post-procedure.  Post-procedure follow up process.  Conscious sedation discussed.    Pre-procedure medication instructions  Patient instructed on antiplatelet medication.  Continue medications as scheduled, with a small amount of water on the day of the procedure unless indicated.  Patient instructed to take (4) 81 mg of Aspirin am of procedure: Yes  Other medication: instructed to only take aspirin, metoprolol, losartan a.m. of the procedure.    *PATIENTS RECORDS AVAILABLE IN [facility] UNLESS OTHERWISE INDICATED*      Patient Active Problem List   Diagnosis     Nicotine Dependence     Ileus     Diarrhea     Carpal Tunnel Syndrome      Medial Epicondylitis     COPD (chronic obstructive pulmonary disease) (H)     Pulmonary hypertension (H)     Chronic diastolic heart failure with preserved ejection fraction (H)     Essential hypertension with goal blood pressure less than 140/90     Chronic respiratory failure with hypoxia and hypercapnia (H)     Type 2 diabetes mellitus without complication, without long-term current use of insulin (H)     Obstructive sleep apnea     Mixed hyperlipidemia     HCD (health care directive)     Acute respiratory failure (H)     Acute on chronic respiratory failure with hypoxia and hypercapnia (H)     COPD exacerbation (H)       Current Outpatient Medications   Medication Sig Dispense Refill     albuterol (PROAIR HFA;PROVENTIL HFA;VENTOLIN HFA) 90 mcg/actuation inhaler Inhale 2 puffs every 4 (four) hours as needed for wheezing. 6.7 g 0     albuterol (PROVENTIL) 2.5 mg /3 mL (0.083 %) nebulizer solution USE 1 VIAL IN NEBULIZER EVERY 4 HOURS AS NEEDED FOR WHEEZING 360 mL 11     aspirin 81 mg TbEF Take 81 mg by mouth once daily.        BLOOD SUGAR DIAGNOSTIC (TRUETEST TEST STRIPS MISC) USE 1 STRIP TWICE DAILY       buPROPion (WELLBUTRIN SR) 150 MG 12 hr tablet Take 1 tablet daily for 3 days, and then 1 tablet twice a day. 60 tablet 3     furosemide (LASIX) 40 MG tablet Take 1 tablet by mouth twice daily 180 tablet 1     losartan (COZAAR) 25 MG tablet TAKE 1 TABLET BY MOUTH ONCE DAILY 90 tablet 3     metoprolol succinate (TOPROL-XL) 25 MG Take 1 tablet (25 mg total) by mouth daily. 90 tablet 3     multivitamin therapeutic tablet Take 1 tablet by mouth daily.       naproxen sodium (ALEVE) 220 MG tablet Take 220 mg by mouth 2 (two) times a day as needed for pain.       STIOLTO RESPIMAT 2.5-2.5 mcg/actuation Mist inhaler INHALE 2 PUFFS INTO LUNGS ONCE DAILY 4 g 11     No current facility-administered medications for this visit.        No Known Allergies      RAMON Peoples

## 2021-06-06 NOTE — TELEPHONE ENCOUNTER
Refill Approved    Rx renewed per Medication Renewal Policy. Medication was last renewed on 6/21/19.    Jackie Myers, [facility] Triage/Med Refill 3/3/2020     Requested Prescriptions   Pending Prescriptions Disp Refills     furosemide (LASIX) 40 MG tablet [Pharmacy Med Name: Furosemide 40 MG Oral Tablet] 60 tablet 0     Sig: Take 1 tablet by mouth twice daily       Diuretics/Combination Diuretics Refill Protocol  Passed - 3/2/2020  2:14 PM        Passed - Visit with PCP or prescribing provider visit in past 12 months     Last office visit with prescriber/PCP: 9/9/2019 Dustin Mariano MD OR same dept: 9/9/2019 Dustni Mariano MD OR same specialty: 9/9/2019 Dustin Mariano MD  Last physical: Visit date not found Last MTM visit: Visit date not found   Next visit within 3 mo: Visit date not found  Next physical within 3 mo: Visit date not found  Prescriber OR PCP: Dustin Mariano MD  Last diagnosis associated with med order: 1. Chronic diastolic congestive heart failure (H)  - furosemide (LASIX) 40 MG tablet [Pharmacy Med Name: Furosemide 40 MG Oral Tablet]; Take 1 tablet by mouth twice daily  Dispense: 60 tablet; Refill: 0    If protocol passes may refill for 12 months if within 3 months of last provider visit (or a total of 15 months).             Passed - Serum Potassium in past 12 months      Lab Results   Component Value Date    Potassium 4.2 09/05/2019             Passed - Serum Sodium in past 12 months      Lab Results   Component Value Date    Sodium 143 09/05/2019             Passed - Blood pressure on file in past 12 months     BP Readings from Last 1 Encounters:   02/14/20 130/70             Passed - Serum Creatinine in past 12 months      Creatinine   Date Value Ref Range Status   09/05/2019 0.77 0.70 - 1.30 mg/dL Final

## 2021-06-06 NOTE — PROGRESS NOTES
Assessment/Plan:        Diagnoses and all orders for this visit:    Cigarette nicotine dependence without complication  -     buPROPion (WELLBUTRIN SR) 150 MG 12 hr tablet; Take 1 tablet daily for 3 days, and then 1 tablet twice a day.  Dispense: 60 tablet; Refill: 3    Obstructive sleep apnea    Pulmonary hypertension (H)    Pulmonary emphysema, unspecified emphysema type (H)    59-year-old man with a history of likely pulmonary hypertension in the setting of significant obstructive sleep apnea, COPD and chronic hypoxic respiratory failure.  I am concerned that he seems to be getting a little bit worse.  It is possible his pulmonary hypertension could be worsened by hypoxemia at night.    His hypoxia is out of proportion to his lung disease, suggesting pulmonary hypertension may be playing a role.  Will discuss RHC and LHC with cards.    Reheck alpha-1 antitrypsin level due to his severe emphysema at a relatively young age.    He will continue his control inhalers for his COPD.    Continue oxygen, cpap.    >3 minutes of counseling for smoking cessation.  This includes medication discussion, risks/benefits, stressors.          Subjective:    Patient ID: Gasper Reddy is a 59 y.o. male.    HPI57-year-old man with a history of emphysema, pulmonary hypertension, obstructive sleep apnea here for follow-up.  He had an episode of hypercarbic respiratory failure in the setting of rhinovirus pneumonia and hospitalization last year.  He is improved quite a bit but is now on more oxygen.  No other changes in sputum. No pain.  He is still smoking cigarettes.    He is historically been on 2 or 3 L/min at rest.  He seems to be using a little bit more now.    Obstructive sleep apnea: He wears his BiPAP every night.  He wears it for more than 4 hours every night.  It improves the quality of his sleep but not as much as before.  He notices that his sats are in the 60s when he wakes up in the morning.    Review of Systems  Review  of systems: X12 systems it is negative except for positives listed above.        Objective:    Physical Exam   Constitutional: He is oriented to person, place, and time. He appears well-developed and well-nourished. No distress.   HENT:   Head: Normocephalic.   Nose: Nose normal.   Mouth/Throat: Oropharynx is clear and moist. No oropharyngeal exudate.   Eyes: Pupils are equal, round, and reactive to light. Right eye exhibits no discharge. Left eye exhibits no discharge. No scleral icterus.   Neck: Normal range of motion. No JVD present. No tracheal deviation present. No thyromegaly present.   Cardiovascular: Normal rate and regular rhythm. Exam reveals no gallop and no friction rub.   No murmur heard.  Pulmonary/Chest: Effort normal and breath sounds normal. No stridor. No respiratory distress. He has no wheezes. He has no rales.   Abdominal: Soft. Bowel sounds are normal. He exhibits no distension. There is no abdominal tenderness.   Musculoskeletal:         General: No tenderness or edema.   Lymphadenopathy:     He has no cervical adenopathy.   Neurological: He is alert and oriented to person, place, and time. No cranial nerve deficit.   Skin: Skin is warm and dry. No rash noted. He is not diaphoretic. No erythema. No pallor.   Psychiatric: He has a normal mood and affect. His behavior is normal. Judgment and thought content normal.           Current Outpatient Medications on File Prior to Visit   Medication Sig Dispense Refill     albuterol (PROAIR HFA;PROVENTIL HFA;VENTOLIN HFA) 90 mcg/actuation inhaler Inhale 2 puffs every 4 (four) hours as needed for wheezing. 6.7 g 0     albuterol (PROVENTIL) 2.5 mg /3 mL (0.083 %) nebulizer solution USE 1 VIAL IN NEBULIZER EVERY 4 HOURS AS NEEDED FOR WHEEZING 360 mL 11     aspirin 81 mg TbEF Take 81 mg by mouth once daily.        BLOOD SUGAR DIAGNOSTIC (TRUETEST TEST STRIPS MISC) USE 1 STRIP TWICE DAILY       furosemide (LASIX) 40 MG tablet Take 1 tablet (40 mg total) by  "mouth daily. 30 tablet 11     losartan (COZAAR) 25 MG tablet TAKE 1 TABLET BY MOUTH ONCE DAILY 90 tablet 3     metoprolol succinate (TOPROL-XL) 25 MG Take 1 tablet (25 mg total) by mouth daily. 90 tablet 3     multivitamin therapeutic tablet Take 1 tablet by mouth daily.       naproxen sodium (ALEVE) 220 MG tablet Take 220 mg by mouth 2 (two) times a day as needed for pain.       STIOLTO RESPIMAT 2.5-2.5 mcg/actuation Mist inhaler INHALE 2 PUFFS INTO LUNGS ONCE DAILY 4 g 11     No current facility-administered medications on file prior to visit.      /70   Pulse 98   Ht 5' 9\" (1.753 m)   Wt 212 lb (96.2 kg)   SpO2 90% Comment: 4 LPM CONT  BMI 31.31 kg/m      Medical History  Active Ambulatory (Non-Hospital) Problems    Diagnosis     Acute respiratory failure (H)     Acute on chronic respiratory failure with hypoxia and hypercapnia (H)     COPD exacerbation (H)     HCD (health care directive)     Mixed hyperlipidemia     Obstructive sleep apnea     Type 2 diabetes mellitus without complication, without long-term current use of insulin (H)     Chronic respiratory failure with hypoxia (H)     Chronic diastolic heart failure with preserved ejection fraction (H)     Essential hypertension with goal blood pressure less than 140/90     Pulmonary hypertension (H)     COPD (chronic obstructive pulmonary disease) (H)     Carpal Tunnel Syndrome     Medial Epicondylitis     Nicotine Dependence     Ileus     Diarrhea     Past Medical History:   Diagnosis Date     (HFpEF) heart failure with preserved ejection fraction (H)      COPD (chronic obstructive pulmonary disease) (H)      Diabetes mellitus (H)      Diverticulitis      Diverticulosis      HTN (hypertension)      Hypothyroid      ART (obstructive sleep apnea)      Pulmonary hypertension (H)      Small bowel obstruction (H)         Surgical History  He  has a past surgical history that includes Appendectomy; Colon surgery (04/2013); and Takedown ileostomy " (05/2013).       Social History  Reviewed, he still living at home.  He is smoking sometimes   Allergies  No Known Allergies FAmily history reiewed: no changes today                          Data Review - imaging, labs, and ekgs listed below were reviewed by me.  Chest XRay and chest CT images and EKG tracings interpreted personally.     Past Labs  Scanned Document on 01/14/2020   Component Date Value     RETINOPATHY 01/14/2020 NEGATIVE      1. BNP normal

## 2021-06-06 NOTE — TELEPHONE ENCOUNTER
Pt called and discussed recommendations. Pt agrees. Patient verbalizes understanding and agrees with plan. No further questions or concerns at this time.

## 2021-06-06 NOTE — PROGRESS NOTES
Assessment: /    Plan:    1. Type 2 diabetes mellitus without complication, without long-term current use of insulin (H)  Microalbumin, Random Urine    Glycosylated Hemoglobin A1c   2. Mixed hyperlipidemia  Lipid Elizabeth FASTING       He will do core strengthening for his lower back.    Recheck diabetes in 6 months.      Subjective:    HPI:  Marlon Reddy is a 59-year-old male presenting for follow-up on diabetes.  Diet controlled.    He has a slightly sore lower back.  No injury.    Recent heart catheterization.      Social Hx: He has not smoked in the past 3 weeks.    Review of Systems: No fever, no weakness of the legs.      Current Outpatient Medications   Medication Sig Dispense Refill     albuterol (PROAIR HFA;PROVENTIL HFA;VENTOLIN HFA) 90 mcg/actuation inhaler Inhale 2 puffs every 4 (four) hours as needed for wheezing. 6.7 g 0     albuterol (PROVENTIL) 2.5 mg /3 mL (0.083 %) nebulizer solution USE 1 VIAL IN NEBULIZER EVERY 4 HOURS AS NEEDED FOR WHEEZING 360 mL 11     aspirin 81 mg TbEF Take 81 mg by mouth once daily.        BLOOD SUGAR DIAGNOSTIC (TRUETEST TEST STRIPS MISC) USE 1 STRIP TWICE DAILY       buPROPion (WELLBUTRIN SR) 150 MG 12 hr tablet Take 150 mg by mouth 2 (two) times a day.       furosemide (LASIX) 40 MG tablet Take 1 tablet by mouth twice daily 180 tablet 1     losartan (COZAAR) 25 MG tablet TAKE 1 TABLET BY MOUTH ONCE DAILY 90 tablet 3     metoprolol succinate (TOPROL-XL) 25 MG Take 1 tablet (25 mg total) by mouth daily. 90 tablet 3     multivitamin therapeutic tablet Take 1 tablet by mouth daily.       naproxen sodium (ALEVE) 220 MG tablet Take 220 mg by mouth 2 (two) times a day as needed for pain.       OXYGEN-AIR DELIVERY SYSTEMS [facility] Use 3 L As Directed daily. 3l per n/c during the day and 1.5 L at bed time       STIOLTO RESPIMAT 2.5-2.5 mcg/actuation Mist inhaler INHALE 2 PUFFS INTO LUNGS ONCE DAILY 4 g 11     tiotropium-olodateroL (STIOLTO RESPIMAT) 2.5-2.5 mcg/actuation Mist inhaler  Inhale 2 Inhalation at bedtime.       No current facility-administered medications for this visit.          Objective:    Vitals:    03/13/20 0911   BP: 136/70   Pulse: 79   Temp: 98.2  F (36.8  C)   SpO2: 98%       Gen:  NAD, VSS  Using oxygen  Lungs:  normal  Heart:  normal  Back is normal    A1c is 5.6      ADDITIONAL HISTORY SUMMARIZED (2): None.  DECISION TO OBTAIN EXTRA INFORMATION (1): None.   RADIOLOGY TESTS (1): None.  LABS (1): A1c.  MEDICINE TESTS (1): None.  INDEPENDENT REVIEW (2 each): None.     Total Data Points: 1

## 2021-06-06 NOTE — TELEPHONE ENCOUNTER
----- Message from Elio Overton MD sent at 3/5/2020 11:14 AM CST -----  Deanne,    Could we set him up w/ myself or ?    Thx!  Elio  ----- Message -----  From: Laron Salas MD  Sent: 3/4/2020   2:19 PM CST  To: MD Aram Cassidy,  This is a patient with emphysema and exertional hypoxia that is way out of proportion to his lung disease. I was hoping to set him up for a right heart cath.    Thanks verymuch, please let me know if there is anything I can do    Santos Salas

## 2021-06-07 NOTE — PROGRESS NOTES
"Gasper Reddy is a 59 y.o. male who is being evaluated via a billable telephone visit.      The patient has been notified of following:     \"This telephone visit will be conducted via a call between you and your physician/provider. We have found that certain health care needs can be provided without the need for a physical exam.  This service lets us provide the care you need with a short phone conversation.  If a prescription is necessary we can send it directly to your pharmacy.  If lab work is needed we can place an order for that and you can then stop by our lab to have the test done at a later time.    Telephone visits are billed at different rates depending on your insurance coverage. During this emergency period, for some insurers they may be billed the same as an in-person visit.  Please reach out to your insurance provider with any questions.    If during the course of the call the physician/provider feels a telephone visit is not appropriate, you will not be charged for this service.\"    Patient has given verbal consent to a Telephone visit? Yes    Patient would like to receive their AVS by AVS Preference: Mail a copy.    Additional provider notes:    Right heart cath with no evidence of left or right heart disease.  This is very good news.    His breathing is improved.  He wears about 3 L/min on his portable and 5 L/min on his tank.  He thinks his breathing is better because he is cut down on his smoking.    He did not tolerate Wellbutrin because of shortness of breath.  He would like to try Chantix.  He is very motivated to quit.    He does not need a refill on his inhalers.    Plan  Chantix prescribed  Continue inhalers  Continue oxygen  Continue nighttime positive pressure ventilation    Follow-up in 6 months.  He will call me if he has any issues with smoking cessation.    CAT:  How often do you cough?: 3  How much phlegm (mucous) do you have in your chest?: 2  How tight does your chest feel?: " 1  How breathless are you after climbing a hill or flight of stairs?: 5  How limited are your activities at home?: 4  How confident are you leaving home despite your lung condition?: 2  How soundly do you sleep?: 2  How much energy do you have?: 3  Total Score: 22      Phone call duration: 7 minutes    Sil Martinez LPN

## 2021-06-07 NOTE — PATIENT INSTRUCTIONS - HE
How should I use this medicine?  You should set a date to stop smoking and tell your doctor. Start this medicine one week before the quit date. You can also start taking this medicine before you choose a quit date, and then pick a quit date that is between 8 and 35 days of treatment with this medicine. Stick to your plan; ask about support groups or other ways to help you remain a 'quitter'.  Take this medicine by mouth after eating. Take with a full glass of water. Follow the directions on the prescription label. Take your doses at regular intervals. Do not take your medicine more often than directed.  A special MedGuide will be given to you by the pharmacist with each prescription and refill. Be sure to read this information carefully each time.  Talk to your pediatrician regarding the use of this medicine in children. This medicine is not approved for use in children.  Overdosage: If you think you have taken too much of this medicine contact a poison control center or emergency room at once.  NOTE: This medicine is only for you. Do not share this medicine with others.  What if I miss a dose?  If you miss a dose, take it as soon as you can. If it is almost time for your next dose, take only that dose. Do not take double or extra doses.  What may interact with this medicine?    alcohol or any product that contains alcohol    insulin    other stop smoking aids    theophylline    warfarin  This list may not describe all possible interactions. Give your health care provider a list of all the medicines, herbs, non-prescription drugs, or dietary supplements you use. Also tell them if you smoke, drink alcohol, or use illegal drugs. Some items may interact with your medicine.  What should I watch for while using this medicine?  Visit your doctor or health care professional for regular check ups. Ask for ongoing advice and encouragement from your doctor or healthcare professional, friends, and family to help you quit. If  you smoke while on this medication, quit again  Your mouth may get dry. Chewing sugarless gum or sucking hard candy, and drinking plenty of water may help. Contact your doctor if the problem does not go away or is severe.  You may get drowsy or dizzy. Do not drive, use machinery, or do anything that needs mental alertness until you know how this medicine affects you. Do not stand or sit up quickly, especially if you are an older patient. This reduces the risk of dizzy or fainting spells.  The use of this medicine may increase the chance of suicidal thoughts or actions. Pay special attention to how you are responding while on this medicine. Any worsening of mood, or thoughts of suicide or dying should be reported to your health care professional right away.  What side effects may I notice from receiving this medicine?  Side effects that you should report to your doctor or health care professional as soon as possible:    allergic reactions like skin rash, itching or hives, swelling of the face, lips, tongue, or throat    breathing problems    changes in vision    chest pain or chest tightness    confusion, trouble speaking or understanding    fast, irregular heartbeat    feeling faint or lightheaded, falls    fever    pain in legs when walking    problems with balance, talking, walking    redness, blistering, peeling or loosening of the skin, including inside the mouth    ringing in ears    seizures    sudden numbness or weakness of the face, arm or leg    suicidal thoughts or other mood changes    trouble passing urine or change in the amount of urine    unusual bleeding or bruising    unusually weak or tired  Side effects that usually do not require medical attention (report to your doctor or health care professional if they continue or are bothersome):    constipation    headache    nausea, vomiting    strange dreams    stomach gas    trouble sleeping  This list may not describe all possible side effects. Call your  doctor for medical advice about side effects. You may report side effects to FDA at 7-223-QYL-3257.  Where should I keep my medicine?  Keep out of the reach of children.  Store at room temperature between 15 and 30 degrees C (59 and 86 degrees F). Throw away any unused medicine after the expiration date.  NOTE:This sheet is a summary. It may not cover all possible information. If you have questions about this medicine, talk to your doctor, pharmacist, or health care provider. Copyright  2015 Gold Standard

## 2021-06-07 NOTE — PROGRESS NOTES
MT Follow Up Encounter  Assessment & Plan                                                     Post Discharge Medication Reconciliation Status: discharge medications reconciled, continue medications without change  1. Nicotine Dependence  Stable. Patient has been nicotine free for about 1 month, updated medical chart today and congratulated patient. Removed bupropion due to side effects. Recommended continuation of current NRT, nicorette 2 mg as needed.  - Appropriate to discontinue use of bupropion due to side effects  - Continue NRT with lozenges    2. Chronic diastolic heart failure with preserved ejection fraction (H)  Managed by cardiology. Using BiPAP and furosemide as directed.     3. Pulmonary emphysema, unspecified emphysema type (H)  Well managed at this time. Continues to use controller Stioloto inhaler as prescribed daily as well as BiPAP and oxygen. Recommend continued follow up with pulmonology as instructed.     4. Essential hypertension with goal blood pressure less than 140/90  Stable. Taking BB, ARB, and loop diuretic. BP at goal of less than 140/90, recommend continuation of current therapy.     Follow Up  Return in about 23 weeks (around 9/15/2020) for Primary Care.    Subjective & Objective                                                     Gasper Reddy is a 59 y.o. male called for an follow up visit for Medication Therapy Management. He was referred to me from Dustin Mariano MD.     Patient consented to a telehealth visit: Yes    Chief Complaint: Follow Up from MT visit on 11/19/19    Medication Adherence/Access: Self management - uses a pillbox, takes medications twice daily. Rarely forgets medication doses.     Tobacco Use: Patient states that he stopped taking bupropion due to feeling shortness of breath. He did quit smoking and then stopped the bupropion. Patient has been using the nicorette lozenges 2 mg every so often, maybe twice weekly, states that he purchases OTC, does not  need a refill. States that he has been smoke free for a month.    HFpEF: Furosemide 40 mg twice daily. No trouble with nighttime awakening. Denies water retention, shortness of breath, or chest pain. Denies increased pillow count, states he pretty much lays flat at night. Been weighing himself regularly.   ECG 3/11/20 by Dr. Overton.  Wt Readings from Last 3 Encounters:   03/13/20 212 lb (96.2 kg)   03/11/20 209 lb 4 oz (94.9 kg)   02/14/20 212 lb (96.2 kg)     COPD: Stiolto 2 puffs once a day. Using albuterol neb 4-5 times daily. Patient states that his lung function has remained the same, not much change.  Using Bipap at night. Been needing up to 3-4L of oxygen.  Saw Dr. Salas, pulmonologist appt is next on 4/16/20.     Hypertension: Taking losartan 25 mg daily, metoprolol succinate 25 mg daily and furosemide 40 mg daily. Denies any dizziness or lightheadedness. Patient measures his BP at home, usually 120-130/70-75. HR ususally 89  BP Readings from Last 3 Encounters:   03/13/20 136/70   03/11/20 114/64   02/14/20 130/70     Lab Results   Component Value Date    CREATININE 0.79 03/11/2020     Lab Results   Component Value Date    K 4.2 03/11/2020     Diabetes:  No longer taking metformin. Diet managed.   SMBG: twice daily    Ranges (patient reported) : Highs 135, lows are 93  Patient is not experiencing hypoglycemia   ACEi/ARB:  Losartan 25 mg daily  Statin: No therapy.   Aspirin: taking 81mg daily, denies bleeding or burising   Diet: Diet has been ok, cheats once in while  Lab Results   Component Value Date    HGBA1C 5.6 03/13/2020    HGBA1C 6.0 09/04/2019    HGBA1C 5.7 04/19/2019     Lab Results   Component Value Date    MICROALBUR 1.47 03/13/2020    LDLCALC 108 03/13/2020    CREATININE 0.79 03/11/2020     PMH: reviewed in EPIC   Allergies/ADRs: reviewed in EPIC   Alcohol: Unable to discuss today.   Tobacco:   Social History     Tobacco Use   Smoking Status Former Smoker     Packs/day: 0.00     Types:  Cigarettes     Last attempt to quit: 3/13/2020     Years since quittin.0   Smokeless Tobacco Never Used   Tobacco Comment    Was  ppd     ----------------  The patient declined an after visit summary    I spent 30 minutes with this patient today.   All changes were made via collaborative practice agreement with Dustin Mariano MD. A copy of the visit note was provided to the patient's provider.     Sandie Tarango (Nadiya), PharmD  Medication Therapy Management Pharmacist  [facility]       Current Outpatient Medications   Medication Sig Dispense Refill     albuterol (PROAIR HFA;PROVENTIL HFA;VENTOLIN HFA) 90 mcg/actuation inhaler Inhale 2 puffs every 4 (four) hours as needed for wheezing. 6.7 g 0     albuterol (PROVENTIL) 2.5 mg /3 mL (0.083 %) nebulizer solution USE 1 VIAL IN NEBULIZER EVERY 4 HOURS AS NEEDED FOR WHEEZING 360 mL 11     aspirin 81 mg TbEF Take 81 mg by mouth once daily.        BLOOD SUGAR DIAGNOSTIC (TRUETEST TEST STRIPS MISC) USE 1 STRIP TWICE DAILY       furosemide (LASIX) 40 MG tablet Take 1 tablet by mouth twice daily 180 tablet 1     losartan (COZAAR) 25 MG tablet TAKE 1 TABLET BY MOUTH ONCE DAILY 90 tablet 3     metoprolol succinate (TOPROL-XL) 25 MG Take 1 tablet (25 mg total) by mouth daily. 90 tablet 3     multivitamin therapeutic tablet Take 1 tablet by mouth daily.       naproxen sodium (ALEVE) 220 MG tablet Take 220 mg by mouth 2 (two) times a day as needed for pain.       OXYGEN-AIR DELIVERY SYSTEMS [facility] [place] Use 3 L As Directed daily. 3l per n/c during the day and 1.5 L at bed time       STIOLTO RESPIMAT 2.5-2.5 mcg/actuation Mist inhaler INHALE 2 PUFFS INTO LUNGS ONCE DAILY 4 g 11     No current facility-administered medications for this visit.

## 2021-06-08 NOTE — PROGRESS NOTES
Assessment/Plan:     1. Heart failure with diastolic dysfunction, NYHA class III: Gasper Reddy appears well compensated. We discussed heart failure, following a low salt diet, monitoring weights, and heart failure treatment.  I started him on metoprolol 25 mg daily for better heart rate control and blood pressure control.  Blood pressure 130/72 with a heart rate of 112.  He states he will monitor his blood pressures at home.  I encouraged him to call the clinic if he experiences any dizziness or lightheadedness with starting metoprolol.  He will continue Lasix 40 mg twice a day.    2.  Obstructive sleep apnea: Pulmonary ordered a sleep study referral. His [place] score is 8 and his STOP-bang score is 6.    Follow-up with one of our heart failure physicians at their next available appointment and in the heart failure clinic in 2 months    Subjective:     Gasper Reddy is seen at [facility] heart failure clinic today for post-hospitalization follow-up.  He was hospitalized at [facility] from December 26 to December 31, 2016 with acute respiratory failure.  His BNP on admission was 278.  He required BiPAP during hospitalization.  He was started on Lasix 40 mg twice a day at discharge.  The most recent evaluation of His ejection fraction was 60% from an  echo on 12/27/2016.  His echocardiogram also showed moderate to severe reduced right ventricle, mild to moderate right atrial enlargement, and elevated right atrial pressure. His past medical history is also significant for hypertension, COPD, diabetes and substance abuse.  He used to smoke one pack of cigarettes per day but has quit since discharge.    Since being discharged from the hospital, Marlon feels that he is improving.  He continues to have fatigue and dyspnea on exertion.  He is on chronic oxygen at 2 L during the day at rest and 4 L with activity and at night.  He denies any other acute heart failure symptoms.  He denies lightheadedness,  shortness of breath, orthopnea, PND, palpitations, chest pain, abdominal fullness/bloating and lower extremity edema.      Gasper Reddy s weight at discharge was 241 pounds.  He is monitoring home weights which are stable between 242-245 pounds.  He is following a low sodium diet.        Medication reconciliation was done today.    Review of Systems:   General: Night Sweats  Eyes: WNL  Ears/Nose/Throat: WNL  Lungs: Cough, Shortness of Breath, Wheezing  Heart: Shortness of Breath with activity  Stomach: Heartburn  Bladder: WNL  Muscle/Joints: WNL  Skin: WNL  Nervous System: WNL  Mental Health: WNL     Blood: WNL    Patient Active Problem List   Diagnosis     Nicotine Dependence     Ileus     Type 2 Diabetes Mellitus - Uncomplicated, Controlled     Diarrhea     Carpal Tunnel Syndrome     Medial Epicondylitis     COPD (chronic obstructive pulmonary disease)     OAD (obstructive airway disease)     Acute respiratory insufficiency     Respiratory failure     Acute respiratory failure with hypoxia and hypercapnia     Pulmonary hypertension     Chronic diastolic congestive heart failure     Essential hypertension with goal blood pressure less than 140/90       Past Medical History   Diagnosis Date     COPD (chronic obstructive pulmonary disease)      Diabetes mellitus      Diverticulitis      Diverticulosis      HTN (hypertension)      Hypothyroid      S/P colon resection      Small bowel obstruction        Past Surgical History   Procedure Laterality Date     Appendectomy         Family History   Problem Relation Age of Onset     Diabetes Mother      COPD Mother      Dementia Father        Social History     Social History     Marital status: Single     Spouse name: N/A     Number of children: N/A     Years of education: N/A     Occupational History     Not on file.     Social History Main Topics     Smoking status: Former Smoker     Packs/day: 0.00     Types: Cigarettes     Start date: 12/26/2016     Quit date:  12/26/2016     Smokeless tobacco: Never Used      Comment: Was 1/4 ppd     Alcohol use 2.0 oz/week     4 Standard drinks or equivalent per week      Comment: on weekends     Drug use: No     Sexual activity: Not on file     Other Topics Concern     Not on file     Social History Narrative       Current Outpatient Prescriptions   Medication Sig Dispense Refill     albuterol (PROAIR HFA) 90 mcg/actuation inhaler Inhale 2 puffs every 4 (four) hours as needed. 1 Inhaler 11     aspirin 81 mg TbEF Take 81 mg by mouth once daily.        BLOOD SUGAR DIAGNOSTIC (TRUETEST TEST STRIPS MISC) USE 1 STRIP TWICE DAILY       furosemide (LASIX) 40 MG tablet Take 1 tablet (40 mg total) by mouth 2 (two) times a day at 9am and 6pm. 180 tablet 3     ipratropium-albuterol (DUO-NEB) 0.5-2.5 mg/3 mL nebulizer Take 3 mL by nebulization 4 (four) times a day. 360 mL 11     losartan (COZAAR) 25 MG tablet Take 1 tablet (25 mg total) by mouth daily. 30 tablet 11     metFORMIN (GLUCOPHAGE) 500 MG tablet Take 1 tablet (500 mg total) by mouth 2 (two) times a day with meals. 180 tablet 1     metoprolol succinate (TOPROL-XL) 25 MG Take 1 tablet (25 mg total) by mouth daily. 90 tablet 3     No current facility-administered medications for this visit.        No Known Allergies    Objective:     Vitals:    01/17/17 1441   BP: 130/72   Pulse: (!) 112   Resp: 20   SpO2: (!) 85%     Wt Readings from Last 3 Encounters:   01/17/17 (!) 248 lb (112.5 kg)   01/17/17 (!) 246 lb (111.6 kg)   01/03/17 (!) 248 lb (112.5 kg)       General Appearance:   Alert, cooperative and in no acute distress.   HEENT:  No scleral icterus; the mucous membranes were pink and moist.   Neck: JVP is difficult to assess due to the patient's obesity and body habitus.    Chest: The spine was straight. The chest was symmetric.   Lungs:   Respirations unlabored; the lungs are clear to auscultation.   Cardiovascular:   Regular rhythm. Tachycardic. S1 and S2 without murmur, clicks or rubs.  Radial and posterior tibial pulses are intact and symmetrical.    Abdomen:  Soft, nontender, nondistended, bowel sounds present   Extremities: No cyanosis or edema.   Skin: No xanthelasma.   Neurologic: Mood and affect are appropriate.         Lab Review   Lab Results   Component Value Date    CREATININE 0.85 01/03/2017    BUN 21 01/03/2017     01/03/2017    K 4.4 01/03/2017    CL 96 (L) 01/03/2017    CO2 32 (H) 01/03/2017     Lab Results   Component Value Date     (H) 12/26/2016     BNP (pg/mL)   Date Value   12/26/2016 278 (H)     CREATININE (mg/dL)   Date Value   01/03/2017 0.85   12/31/2016 1.05   12/30/2016 0.85   12/29/2016 0.82       Cardiographics  Echocardiogram: 12/27/2016  Summary   1. Normal left ventricular size and systolic performance with a visually estimated ejection fraction of 60%.  2. There is mild concentric increase in left ventricular wall thickness.  3. No significant valvular heart disease is identified in this study.   4. There is moderate right ventricular enlargement. Right ventricular systolic performance is moderate to severely reduced.  5. There is mild to moderate right atrial enlargement.  6. Right ventricular systolic pressure cannot be directly estimated from TR velocities due to insufficient tricuspid regurgitation.   7. The IVC appears dilated with decreased phasic variation in caval diameter consistent with elevated right atrial pressure.           25 minutes were face to face spent with the patient with greater than 50% spent on education and counseling.      Sandie Laurent, [facility]   Heart Failure Clinic

## 2021-06-08 NOTE — PROGRESS NOTES
"Assessment: /    Plan:    1. Chronic diastolic congestive heart failure  HM2(CBC w/o Differential)    Magnesium    Basic Metabolic Panel   2. Essential hypertension with goal blood pressure less than 140/90  losartan (COZAAR) 25 MG tablet       Take losartan 25 mg daily.  He will have pulmonary and cardiology follow-up.      Subjective:    HPI:  Marlon Reddy is a 56-year-old male presenting for follow-up of hospitalization.  He was at [facility] 12/26/16 - 12/31/16.  He had COPD and diastolic dysfunction causing respiratory failure.  I reviewed the discharge summary by Dr. Mckinney.    He was previously taking metformin 1 daily and is now taking 2 daily.       Review of Systems:  No fever or dizziness.      Current Outpatient Prescriptions   Medication Sig Dispense Refill     albuterol (PROAIR HFA) 90 mcg/actuation inhaler Inhale 2 puffs every 4 (four) hours as needed. 1 Inhaler 11     aspirin 81 mg TbEF Take 81 mg by mouth once daily.        BLOOD SUGAR DIAGNOSTIC (TRUETEST TEST STRIPS MISC) USE 1 STRIP TWICE DAILY       ipratropium-albuterol (DUO-NEB) 0.5-2.5 mg/3 mL nebulizer Take 3 mL by nebulization 4 (four) times a day. 360 mL 11     furosemide (LASIX) 40 MG tablet Take 1 tablet (40 mg total) by mouth 2 (two) times a day at 9am and 6pm. 180 tablet 3     losartan (COZAAR) 25 MG tablet Take 1 tablet (25 mg total) by mouth daily. 30 tablet 11     metFORMIN (GLUCOPHAGE) 500 MG tablet Take 1 tablet (500 mg total) by mouth daily with breakfast. 180 tablet 1     metoprolol succinate (TOPROL-XL) 25 MG Take 1 tablet (25 mg total) by mouth daily. 90 tablet 3     No current facility-administered medications for this visit.          Objective:    Vitals:    01/03/17 1256 01/03/17 1316   BP: 140/86 128/82   Pulse: 100    Resp: 20    Temp: 98.5  F (36.9  C)    TempSrc: Oral    SpO2: (!) 85%    Weight: (!) 248 lb (112.5 kg)    Height: 5' 9\" (1.753 m)        Gen:  NAD, VSS  Lungs:  normal  Heart:  normal  No ankle " edema        ADDITIONAL HISTORY SUMMARIZED (2): Reviewed discharge summary.  DECISION TO OBTAIN EXTRA INFORMATION (1): None.   RADIOLOGY TESTS (1): None.  LABS (1): None.  MEDICINE TESTS (1): None.  INDEPENDENT REVIEW (2 each): None.     Total Data Points: 2

## 2021-06-08 NOTE — PROGRESS NOTES
[facility] Heart Care Office Consult     Assessment:     1. Chronic diastolic congestive heart failure -no signs or symptoms on exam currently.  He knows limit salt and fluids.  Check and his weight daily and has furosemide.     2. Diabetes -defer to primary physician.     3. Essential hypertension with goal blood pressure less than 140/90 -blood pressure under good control currently on metoprolol and losartan.     4. Pulmonary hypertension -assumed pulmonary hypertension based on right-sided heart dysfunction.  This is on the basis of sleep apnea, emphysema, and obesity hypoventilation.     5. Obesity-weight loss.     6. Nicotine Dependence -he has stopped smoking and I applauded him for this.     7. Pulmonary emphysema, unspecified emphysema type -defer to pulmonary.  On nebulizers and inhalers.     8.  Right heart abnormality-secondary to sleep apnea and obesity hypoventilation and emphysema.  Continue to refrain from cigarettes and agree with sleep study.     Plan:   1.  Continue current therapy.  2.  Follow-up with me in 4 months or sooner if needed.    History of Present Illness:   Thank you for asking the [facility] Heart Care team to see Gasper Reddy a 56 y.o. male  in consultation  to evaluate heart failure with preserved ejection fraction.   Around Pro time patient developed increased shortness of breath with bronchitis.  He was admitted to the hospital and felt to have a COPD acute exacerbation as well as heart failure with preserved ejection fraction.  He was discharged to the heart failure nurse practitioner and subsequently sent here for further evaluation.  He admits to shortness of breath on heavy activity but denies chest discomfort, palpitations, PND, orthopnea or peripheral edema.    Past Medical History:     Past Medical History:   Diagnosis Date     COPD (chronic obstructive pulmonary disease) with recurrent bronchitis       Diabetes mellitus      Diverticulitis      Diverticulosis       HTN (hypertension)      Hypothyroid      S/P colon resection      Small bowel obstruction  Obstructive sleep apnea       Past history is negative for cancer, tuberculosis, diabetes mellitus, myocardial infarction,  rheumatic fever,  cerebrovascular accident, chronic kidney disease, peptic ulcer disease,   and no lipid disorder.    Past Surgical History:     Past Surgical History:   Procedure Laterality Date     APPENDECTOMY  Small bowel obstruction due to diverticulitis        Family History:   Family history is positive for some sort of heart problem in his father's father in his 80s.    Social History:   He reports that he quit smoking about 7 weeks ago. His smoking use included Cigarettes 2-3 packs a day for 35 years. He smoked 0.00 packs per day. He has never used smokeless tobacco. He reports that he drinks about 2.0 oz of alcohol per week, having drank 3-4 day and having had 3 DWIs and he is lost his license.  He reports that he does not use illicit drugs, is not working due to medical reasons, not applying for Social Security disability yet, lives independently with his brother and is single, and used to work [employer].The primary care physician is Dustin Mariano MD    Meds:   Scheduled Meds:  Current Outpatient Prescriptions   Medication Sig Dispense Refill     albuterol (PROAIR HFA) 90 mcg/actuation inhaler Inhale 2 puffs every 4 (four) hours as needed. 1 Inhaler 11     aspirin 81 mg TbEF Take 81 mg by mouth once daily.        BLOOD SUGAR DIAGNOSTIC (TRUETEST TEST STRIPS MISC) USE 1 STRIP TWICE DAILY       furosemide (LASIX) 40 MG tablet Take 1 tablet (40 mg total) by mouth 2 (two) times a day at 9am and 6pm. 180 tablet 3     ipratropium-albuterol (DUO-NEB) 0.5-2.5 mg/3 mL nebulizer Take 3 mL by nebulization 4 (four) times a day. 360 mL 11     losartan (COZAAR) 25 MG tablet Take 1 tablet (25 mg total) by mouth daily. 30 tablet 11     metFORMIN (GLUCOPHAGE) 500 MG tablet Take 1 tablet (500 mg  "total) by mouth daily with breakfast. 180 tablet 1     metoprolol succinate (TOPROL-XL) 25 MG Take 1 tablet (25 mg total) by mouth daily. 90 tablet 3     No current facility-administered medications for this visit.      PRN Meds:.    Allergies:   Review of patient's allergies indicates no known allergies.    Objective:      Physical Exam  (!) 247 lb (112 kg)  5' 9\" (1.753 m)  Body mass index is 36.48 kg/(m^2).  Visit Vitals     /72 (Cuff Size: Adult Large)     Pulse 84     Resp 20     Ht 5' 9\" (1.753 m)     Wt (!) 247 lb (112 kg)     BMI 36.48 kg/m2       General Appearance:   Alert, cooperative and in no acute distress. moderately obese    HEENT:  No scleral icterus; the mucous membranes were pink and moist.   Neck: JVP to jaw at 30 . No thyromegaly. No HJR   Chest: The spine was straight. The chest was symmetric.   Lungs:   Respirations unlabored; the lungs are clear to auscultation.   Cardiovascular:   S1 and S2 without murmur, clicks or rubs. Brachial, radial, carotid and posterior tibial pulses are intact and symetrical.  No carotid bruits noted   Abdomen:  No organomegaly, masses, bruits, or tenderness. Bowels sounds are present   Extremities: No cyanosis, clubbing, or edema.   Skin: No xanthelasma.   Neurologic: Mood and affect are appropriate.         Lab Reviewed Personally by myself  Lab Results   Component Value Date     01/03/2017    K 4.4 01/03/2017    CL 96 (L) 01/03/2017    CO2 32 (H) 01/03/2017    BUN 21 01/03/2017    CREATININE 0.85 01/03/2017    CALCIUM 9.5 01/03/2017     Lab Results   Component Value Date    WBC 10.4 01/03/2017    WBC 8.4 08/03/2015    HGB 18.6 (H) 01/03/2017    HCT 57.0 (H) 01/03/2017    MCV 94 01/03/2017     01/03/2017     Lab Results   Component Value Date    CHOL 162 10/28/2016    TRIG 126 10/28/2016    HDL 42 10/28/2016    LDLDIRECT 78 10/10/2014     Lab Results   Component Value Date     (H) 12/26/2016       ECG personally reviewed by myself shows " sinus rhythm, leftward axis, poor R-wave progression, no acute changes.     Review of Systems:     Review of Systems:   General: WNL  Eyes: WNL  Ears/Nose/Throat: WNL  Lungs: WNL  Heart: WNL  Stomach: WNL  Bladder: WNL  Muscle/Joints: WNL  Skin: WNL  Nervous System: WNL  Mental Health: WNL     Blood: WNL

## 2021-06-08 NOTE — PROGRESS NOTES
"Assessment/Plan:        Diagnoses and all orders for this visit:    Sleep-disordered breathing  -     Split Night Sleep Study; Future    Chronic diastolic congestive heart failure  -     furosemide (LASIX) 40 MG tablet; Take 1 tablet (40 mg total) by mouth 2 (two) times a day at 9am and 6pm.  Dispense: 180 tablet; Refill: 3    Pulmonary hypertension    Your breathing issues are related to your heart and stopping breathing when you are sleeping.    1) Sleep study. See instructions below.     2) Monitor fluid levels.   - weigh yourself daily - same scale   - if you gain more than 2 lbs 2 days in a row, take an extra pill of lasix.    3) Oxygen   - 2 liters per minute during the day - \"at rest\"   - 4 liters per minute with activity   - 4 liters per minute at night when sleeping    4) Exercise  Stay active as you are with daily activities.    Overall you have cut your oxygen requirement at rest in half since leaving the hospital.  This is a good start.    I recommend that everyone living with you quit smoking.        Subjective:    Patient ID: Gasper Reddy is a 56 y.o. male.    HPI Comments: 56M here for follow up of acute hypoxic respiratory failure, pulmonary hypertension, hypoventilation.  He is doing better.  His breathing has improved somewhat, but he is very short of breath off of oxygen.  His dyspnea is worse with exertion.  It improves with rest.  Symptoms localize to the chest.  No other pertinent positives.  Pertinent negatives include no sputum no hemoptysis no leg swelling.    He has been taking his diuretics.  His weight has been stable.    [place] oxygen.    [facility] 8          Review of Systems   Constitutional: Positive for activity change, chills, fatigue and fever.   HENT: Positive for congestion, dental problem, postnasal drip and trouble swallowing.    Eyes: Negative.    Respiratory: Negative.    Cardiovascular: Positive for chest pain and leg swelling.   Gastrointestinal: Positive for " abdominal pain.   Endocrine: Positive for polydipsia.   Genitourinary: Negative.    Musculoskeletal: Positive for arthralgias and myalgias.   Skin: Negative.    Allergic/Immunologic: Negative.    Neurological: Positive for tremors, weakness and light-headedness.   Hematological: Negative.    Psychiatric/Behavioral: Positive for sleep disturbance.             Objective:    Physical Exam   Constitutional: He is oriented to person, place, and time. He appears well-developed and well-nourished. No distress.   HENT:   Head: Normocephalic.   Nose: Nose normal.   Mouth/Throat: Oropharynx is clear and moist. No oropharyngeal exudate.   Eyes: Pupils are equal, round, and reactive to light. Right eye exhibits no discharge. Left eye exhibits no discharge. No scleral icterus.   Neck: Normal range of motion. No JVD present. No tracheal deviation present. No thyromegaly present.   Cardiovascular: Normal rate and regular rhythm.  Exam reveals no gallop and no friction rub.    No murmur heard.  Pulmonary/Chest: Effort normal and breath sounds normal. No stridor. No respiratory distress. He has no wheezes. He has no rales.   Abdominal: Soft. Bowel sounds are normal. He exhibits no distension. There is no tenderness.   Musculoskeletal: He exhibits no edema or tenderness.   Lymphadenopathy:     He has no cervical adenopathy.   Neurological: He is alert and oriented to person, place, and time. No cranial nerve deficit.   Skin: Skin is warm and dry. No rash noted. He is not diaphoretic. No erythema. No pallor.   Psychiatric: He has a normal mood and affect. His behavior is normal. Judgment and thought content normal.           Current Outpatient Prescriptions on File Prior to Visit   Medication Sig Dispense Refill     albuterol (PROAIR HFA) 90 mcg/actuation inhaler Inhale 2 puffs every 4 (four) hours as needed. 1 Inhaler 11     aspirin 81 mg TbEF Take 81 mg by mouth once daily.        BLOOD SUGAR DIAGNOSTIC (TRUETEST TEST STRIPS MISC)  "USE 1 STRIP TWICE DAILY       furosemide (LASIX) 40 MG tablet Take 1 tablet (40 mg total) by mouth 2 (two) times a day at 9am and 6pm. 60 tablet 0     ipratropium-albuterol (DUO-NEB) 0.5-2.5 mg/3 mL nebulizer Take 3 mL by nebulization 4 (four) times a day. 360 mL 11     losartan (COZAAR) 25 MG tablet Take 1 tablet (25 mg total) by mouth daily. 30 tablet 11     metFORMIN (GLUCOPHAGE) 500 MG tablet Take 1 tablet (500 mg total) by mouth 2 (two) times a day with meals. 180 tablet 1     No current facility-administered medications on file prior to visit.      Visit Vitals     /74     Pulse (!) 102     Resp 20     Ht 5' 9\" (1.753 m)     Wt (!) 246 lb (111.6 kg)     SpO2 93%  Comment: 4LPM     BMI 36.33 kg/m2       Medical History  Active Ambulatory (Non-Hospital) Problems    Diagnosis     Chronic diastolic congestive heart failure     Essential hypertension with goal blood pressure less than 140/90     Pulmonary hypertension     Acute respiratory failure with hypoxia and hypercapnia     Respiratory failure     Acute congestive heart failure, unspecified congestive heart failure type     OAD (obstructive airway disease)     Acute respiratory insufficiency     COPD with acute exacerbation     COPD (chronic obstructive pulmonary disease)     Carpal Tunnel Syndrome     Medial Epicondylitis     Nicotine Dependence     Ileus     Type 2 Diabetes Mellitus - Uncomplicated, Controlled     Chronic Obstructive Pulmonary Disease     Diarrhea     Past Medical History   Diagnosis Date     COPD (chronic obstructive pulmonary disease)      Diabetes mellitus      Diverticulitis      Diverticulosis      HTN (hypertension)      Hypothyroid      S/P colon resection      Small bowel obstruction         Surgical History  He  has a past surgical history that includes Appendectomy.       Social History  Reviewed, and he  reports that he quit smoking about 3 weeks ago. His smoking use included Cigarettes. He started smoking about 3 weeks " ago. He smoked 0.00 packs per day. He has never used smokeless tobacco. He reports that he drinks about 2.0 oz of alcohol per week  He reports that he does not use illicit drugs.       Allergies  No Known Allergies Family History  Reviewed, and family history includes COPD in his mother; Dementia in his father; Diabetes in his mother.                            Data Review - imaging, labs, and ekgs listed below were reviewed by me.  Chest XRay and chest CT images and EKG tracings interpreted personally.     Past Labs  Office Visit on 01/03/2017   Component Date Value     WBC 01/03/2017 10.4      RBC 01/03/2017 6.05      Hemoglobin 01/03/2017 18.6*     Hematocrit 01/03/2017 57.0*     MCV 01/03/2017 94      MCH 01/03/2017 30.7      MCHC 01/03/2017 32.6      RDW 01/03/2017 12.2      Platelets 01/03/2017 184      MPV 01/03/2017 8.1      Magnesium 01/03/2017 1.5*     Sodium 01/03/2017 141      Potassium 01/03/2017 4.4      Chloride 01/03/2017 96*     CO2 01/03/2017 32*     Anion Gap, Calculation 01/03/2017 13      Glucose 01/03/2017 133*     Calcium 01/03/2017 9.5      BUN 01/03/2017 21      Creatinine 01/03/2017 0.85      GFR MDRD Af Amer 01/03/2017 >60      GFR MDRD Non Af Amer 01/03/2017 >60    Admission on 12/26/2016, Discharged on 12/31/2016   No results displayed because visit has over 200 results.          Past Imaging  Xr Chest Pa And Lateral    Result Date: 12/26/2016  XR CHEST PA AND LATERAL 12/26/2016 1:58 PM INDICATION: SOB, Wheezing COMPARISON: 08/03/2015 FINDINGS: There is new pulmonary vascular congestion with mild new interstitial opacities consistent with interstitial edema. The heart size is in the upper range of normal. The pleural spaces appear normal.    Cta Chest Pe Run    Result Date: 12/26/2016  CTA CHEST PE RUN 12/26/2016 3:45 PM INDICATION: Shortness of breath, tachycardia, hypoxia TECHNIQUE: Helical acquisition through the chest was performed during the arterial phase of contrast enhancement  using IV contrast. 2D and 3D reconstructions were performed by the CT technologist. Dose reduction techniques were used. IV CONTRAST: Iohexol (Omni) 100 mL COMPARISON: None. FINDINGS: ANGIOGRAM CHEST: Motion artifact degrades images. No evidence pulmonary embolism, aortic dissection, or coronary atherosclerosis. LUNGS AND PLEURA: Centrilobular emphysema. No infiltrate or effusion. MEDIASTINUM: No adenopathy. LIMITED UPPER ABDOMEN: Diffuse hepatic steatosis. MUSCULOSKELETAL: Several old rib fractures.     CONCLUSION: 1.  Suboptimal study due to motion. 2.  No pulmonary embolism seen. 3.  Emphysema. 4.  Diffuse hepatic steatosis.

## 2021-06-08 NOTE — PROGRESS NOTES
Order for sleep study received, reviewed, and approved.     Do oxygen assessment prior to PSG. Consider Bi-level

## 2021-06-09 NOTE — PROGRESS NOTES
Dr. Salas is ok switching to the Spiriva.  Abigail sent in new rx for this.  I LMTCB for pt, so that I can let him know about this change.

## 2021-06-09 NOTE — PROGRESS NOTES
Patient instructed in use of Anoro ellipta inhaler.  Patient able to use the ellipta device without any difficulty. Return demo and first dose given in clinic.  Samples given. Phone numbers given to call if any questions.  Patient also given phone numbers to call if any questions.

## 2021-06-09 NOTE — PROGRESS NOTES
Received a fax back from  asking if the pt has ever used Spiriva.  I called pt and he states that he has not used it before.  I faxed this info back to them.

## 2021-06-09 NOTE — PROGRESS NOTES
Anoro no longer covered by patient's insurance.  New Rx for formulary alternative spiriva respimat sent to patient's pharmacy.

## 2021-06-09 NOTE — PROGRESS NOTES
Pt called to let us know that he got a letter from his insurance saying that the Anoro Ellipta needs a PA.  I called [employer] and they confirmed that they paid for the first month of the Anoro, but it will need a PA for any more fills.  They faxed me a PA form and I filled it out and faxed it back to them.  Will watch for response.

## 2021-06-09 NOTE — PROGRESS NOTES
Assessment/Plan:        Diagnoses and all orders for this visit:    Chronic diastolic congestive heart failure    Chronic respiratory failure with hypoxia    Pulmonary emphysema, unspecified emphysema type  -     umeclidinium-vilanterol (ANORO ELLIPTA) 62.5-25 mcg/actuation inhaler; Inhale 1 puff daily.  Dispense: 1 Inhaler; Refill: 0  -     PFT Complete; Future; Expected date: 4/28/17    Pulmonary hypertension    ART (obstructive sleep apnea)    Continues to improve.  He technically has ART but AHI is only 0.6 above normal.  Continue regular exercise., close  Monitoring of fluid status with daily weights.    Medication Plan  Stop duonebs  Start anoro once daily   -call us in 7-10 days to let us know how its going    Oxygen Plan  1-2 lpm at rest.  3-4 lpm with activity - turn it up if you're going for walk.  3 lpm at night    I am going to discuss your sleep test with a sleep specialist.        Subjective:    Patient ID: Gasper Reddy is a 56 y.o. male.    HPI Comments: Chronic hypoxic respiratory failure - 3 lpm continuous.  This keeps his sats in 90s at rest and goes to 80s with activity.  He is using his oxygen every day.  No new issues.    Chronic diastolic heart failure - weighing himself daily.  Taking diuretics.  Good urine output. No new leg or flank swelling. No orthopnea.    Pulmonary emphysema - no bronchitis or chest colds. He does bring up sputum and gets wheezy occasionally.  He is using duonebs 3-4 times daily.  He has no PFTs in the past.    ART - very  Mild at 5.6, just above normal.  He tolerated sleep study fine, but did not try the mask because he needed full nights test.  His sleep is reasonable right now. He had a lot of trouble sleeping during the test.      --- from previous  56M here for follow up of acute hypoxic respiratory failure, pulmonary hypertension, hypoventilation.  He is doing better.  His breathing has improved somewhat, but he is very short of breath off of oxygen.  His  dyspnea is worse with exertion.  It improves with rest.  Symptoms localize to the chest.  No other pertinent positives.  Pertinent negatives include no sputum no hemoptysis no leg swelling.    He has been taking his diuretics.  His weight has been stable.    [facility].    [facility] 8          Review of Systems   Constitutional: Positive for activity change.   Eyes: Negative.    Respiratory: Positive for shortness of breath and wheezing.    Cardiovascular: Positive for leg swelling.   Musculoskeletal: Positive for myalgias.   Skin: Negative.    Psychiatric/Behavioral: Positive for sleep disturbance.             Objective:    Physical Exam   Constitutional: He is oriented to person, place, and time. He appears well-developed and well-nourished. No distress.   HENT:   Head: Normocephalic.   Nose: Nose normal.   Mouth/Throat: Oropharynx is clear and moist. No oropharyngeal exudate.   Eyes: Pupils are equal, round, and reactive to light. Right eye exhibits no discharge. Left eye exhibits no discharge. No scleral icterus.   Neck: Normal range of motion. No JVD present. No tracheal deviation present. No thyromegaly present.   Cardiovascular: Normal rate and regular rhythm.  Exam reveals no gallop and no friction rub.    No murmur heard.  Pulmonary/Chest: Effort normal and breath sounds normal. No stridor. No respiratory distress. He has no wheezes. He has no rales.   Abdominal: Soft. Bowel sounds are normal. He exhibits no distension. There is no tenderness.   Musculoskeletal: He exhibits no edema or tenderness.   Lymphadenopathy:     He has no cervical adenopathy.   Neurological: He is alert and oriented to person, place, and time. No cranial nerve deficit.   Skin: Skin is warm and dry. No rash noted. He is not diaphoretic. No erythema. No pallor.   Psychiatric: He has a normal mood and affect. His behavior is normal. Judgment and thought content normal.           Current Outpatient Prescriptions on File Prior to  Visit   Medication Sig Dispense Refill     albuterol (PROAIR HFA) 90 mcg/actuation inhaler Inhale 2 puffs every 4 (four) hours as needed. 1 Inhaler 11     aspirin 81 mg TbEF Take 81 mg by mouth once daily.        BLOOD SUGAR DIAGNOSTIC (TRUETEST TEST STRIPS MISC) USE 1 STRIP TWICE DAILY       furosemide (LASIX) 40 MG tablet Take 1 tablet (40 mg total) by mouth 2 (two) times a day at 9am and 6pm. 180 tablet 3     ipratropium-albuterol (DUO-NEB) 0.5-2.5 mg/3 mL nebulizer Take 3 mL by nebulization 4 (four) times a day. 360 mL 11     losartan (COZAAR) 25 MG tablet Take 1 tablet (25 mg total) by mouth daily. 30 tablet 11     metFORMIN (GLUCOPHAGE) 500 MG tablet Take 1 tablet (500 mg total) by mouth daily with breakfast. 180 tablet 1     metoprolol succinate (TOPROL-XL) 25 MG Take 1 tablet (25 mg total) by mouth daily. 90 tablet 3     No current facility-administered medications on file prior to visit.      Visit Vitals     /72     Pulse 96     Resp 20     Wt (!) 250 lb 6.4 oz (113.6 kg)     SpO2 94%  Comment: 3 LPM     BMI 36.98 kg/m2       Medical History  Active Ambulatory (Non-Hospital) Problems    Diagnosis     Chronic diastolic congestive heart failure     Essential hypertension with goal blood pressure less than 140/90     Pulmonary hypertension     Acute respiratory failure with hypoxia and hypercapnia     Respiratory failure     OAD (obstructive airway disease)     Acute respiratory insufficiency     COPD (chronic obstructive pulmonary disease)     Carpal Tunnel Syndrome     Medial Epicondylitis     Nicotine Dependence     Ileus     Type 2 diabetes mellitus     Diarrhea     Past Medical History:   Diagnosis Date     COPD (chronic obstructive pulmonary disease)      Diabetes mellitus      Diverticulitis      Diverticulosis      HTN (hypertension)      Hypothyroid      S/P colon resection      Small bowel obstruction         Surgical History  He  has a past surgical history that includes Appendectomy.        Social History  Reviewed, and he is still living with his brother who smokes. He has stopped smoking.     Allergies  No Known Allergies                           Data Review - imaging, labs, and ekgs listed below were reviewed by me.  Chest XRay and chest CT images and EKG tracings interpreted personally.     Past Labs  Office Visit on 01/03/2017   Component Date Value     WBC 01/03/2017 10.4      RBC 01/03/2017 6.05      Hemoglobin 01/03/2017 18.6*     Hematocrit 01/03/2017 57.0*     MCV 01/03/2017 94      MCH 01/03/2017 30.7      MCHC 01/03/2017 32.6      RDW 01/03/2017 12.2      Platelets 01/03/2017 184      MPV 01/03/2017 8.1      Magnesium 01/03/2017 1.5*     Sodium 01/03/2017 141      Potassium 01/03/2017 4.4      Chloride 01/03/2017 96*     CO2 01/03/2017 32*     Anion Gap, Calculation 01/03/2017 13      Glucose 01/03/2017 133*     Calcium 01/03/2017 9.5      BUN 01/03/2017 21      Creatinine 01/03/2017 0.85      GFR MDRD Af Amer 01/03/2017 >60      GFR MDRD Non Af Amer 01/03/2017 >60    Admission on 12/26/2016, Discharged on 12/31/2016   No results displayed because visit has over 200 results.          Sleep study read reviewed as well as raw data. Reviewed with patient.    Frequent non respiratory arousals.  AHI 5.6.  Very low REM.

## 2021-06-09 NOTE — PROGRESS NOTES
PA for the Anoro was denied, needs to try and fail Spiriva.  Staff message to Dr. Salas to see what he wants to do.

## 2021-06-09 NOTE — TELEPHONE ENCOUNTER
Refill Approved    Rx renewed per Medication Renewal Policy. Medication was last renewed on 7/11/19.    Jackie Myers, [facility] Triage/Med Refill 7/13/2020     Requested Prescriptions   Pending Prescriptions Disp Refills     metoprolol succinate (TOPROL-XL) 25 MG [Pharmacy Med Name: Metoprolol Succinate ER 25 MG Oral Tablet Extended Release 24 Hour] 30 tablet 0     Sig: Take 1 tablet by mouth once daily       Beta-Blockers Refill Protocol Passed - 7/12/2020 10:15 AM        Passed - PCP or prescribing provider visit in past 12 months or next 3 months     Last office visit with prescriber/PCP: 3/13/2020 Dustin Mariano MD OR same dept: 3/13/2020 Dustin Mariano MD OR same specialty: 3/13/2020 Dustin Mariano MD  Last physical: Visit date not found Last MTM visit: Visit date not found   Next visit within 3 mo: Visit date not found  Next physical within 3 mo: Visit date not found  Prescriber OR PCP: Dustin Mariano MD  Last diagnosis associated with med order: 1. Chronic diastolic congestive heart failure (H)  - metoprolol succinate (TOPROL-XL) 25 MG [Pharmacy Med Name: Metoprolol Succinate ER 25 MG Oral Tablet Extended Release 24 Hour]; Take 1 tablet by mouth once daily  Dispense: 30 tablet; Refill: 0    If protocol passes may refill for 12 months if within 3 months of last provider visit (or a total of 15 months).             Passed - Blood pressure filed in past 12 months     BP Readings from Last 1 Encounters:   04/16/20 130/70

## 2021-06-09 NOTE — PROGRESS NOTES
Pt called back and I informed him of the change and I asked pt. to let us know if they have any side effects or if it is not working.  He verbalizes understanding and agrees to this plan.

## 2021-06-10 NOTE — PROGRESS NOTES
Assessment: /    Plan:    1. Type 2 diabetes mellitus without complication, without long-term current use of insulin  Glycosylated Hemoglobin A1c   2. Pulmonary emphysema, unspecified emphysema type  albuterol (PROAIR HFA) 90 mcg/actuation inhaler   3. Chronic left shoulder pain  MR Shoulder Without Contrast Left       Further follow-up depending upon test results.      Subjective:    HPI:  Marlon Reddy is a 56-year-old male presenting for follow-up on diabetes.  Fingerstick glucose readings have ranged from .  Usual reading is close to 110.    He also notes ongoing left shoulder pain.    Social Hx: He has not been able to work for the past 2 years due to his COPD.    Review of Systems: No fever or sputum production.      Current Outpatient Prescriptions   Medication Sig Dispense Refill     albuterol (PROAIR HFA) 90 mcg/actuation inhaler Inhale 2 puffs every 4 (four) hours as needed. 18 g 11     albuterol (PROVENTIL) 2.5 mg /3 mL (0.083 %) nebulizer solution Take 3 mL (2.5 mg total) by nebulization every 4 (four) hours as needed for wheezing. 25 vial 11     aspirin 81 mg TbEF Take 81 mg by mouth once daily.        BLOOD SUGAR DIAGNOSTIC (TRUETEST TEST STRIPS MISC) USE 1 STRIP TWICE DAILY       ipratropium-albuterol (DUO-NEB) 0.5-2.5 mg/3 mL nebulizer Take 3 mL by nebulization 4 (four) times a day. 360 mL 11     losartan (COZAAR) 25 MG tablet Take 1 tablet (25 mg total) by mouth daily. 30 tablet 11     metFORMIN (GLUCOPHAGE) 500 MG tablet TAKE ONE TABLET BY MOUTH TWICE DAILY WITH MEALS 180 tablet 1     metoprolol succinate (TOPROL-XL) 25 MG Take 1 tablet (25 mg total) by mouth daily. 90 tablet 3     naproxen (NAPROSYN) 500 MG tablet 1 tablet daily.       tiotropium bromide (SPIRIVA RESPIMAT) 2.5 mcg/actuation Mist Inhale 2 puffs daily. 4 g 11     albuterol (PROAIR HFA;PROVENTIL HFA;VENTOLIN HFA) 90 mcg/actuation inhaler Inhale 2 puffs every 4 (four) hours as needed for wheezing. 6.7 g 0     No current  facility-administered medications for this visit.          Objective:    Vitals:    04/28/17 1632   BP: 110/70   Pulse: 82   Resp: 22   Temp: 98.3  F (36.8  C)   SpO2: 93%       Gen:  NAD, VSS  Lungs:  normal  Heart:  normal  Left shoulder with full range of motion.  Tenderness of the deltoid muscle.  Empty can test is positive.        ADDITIONAL HISTORY SUMMARIZED (2): None.  DECISION TO OBTAIN EXTRA INFORMATION (1): None.   RADIOLOGY TESTS (1): MRI ordered.  LABS (1): A1c.  MEDICINE TESTS (1): None.  INDEPENDENT REVIEW (2 each): None.     Total Data Points: 2

## 2021-06-10 NOTE — PROGRESS NOTES
Assessment/Plan:        Diagnoses and all orders for this visit:    Pulmonary emphysema, unspecified emphysema type  -     Check Pulse Oximetry while ambulating    Chronic respiratory failure with hypoxia  -     Check Pulse Oximetry while ambulating    Obstructive sleep apnea    Tobacco use     56-year-old man with severe COPD, chronic respiratory failure obstructive sleep apnea likely pulmonary hypertension here for follow-up.  He continues to significantly improve.  He should continue to abstain from smoking as much as possible.  Exercise regularly uses oxygen as directed and start BiPAP.  I anticipate we will be able to improve further.    His emphysema is severe for a man in his 50s but based on his very heavy smoking history and age I do not think further evaluation for alpha-1 antitrypsin is necessary presently.  I will discuss this with my colleagues.    Medication Plan  Anoro once daily  Albuterol    Sleep   I will send a prescription for BiPAP to [facility].    Oxygen Plan  1-2 lpm at rest.  3-4 lpm with activity - we will evaluate you for a concentrator today.  1 lpm at night once you are on your BiPAP.    Due to desaturation of SaO2 less than or equal to 88% on room air at rest from copd, home oxygen therapy will benefit my patient's condition.  The patient has tried (or considered) other medications with limited success and oxygen is still required. The patient is mobile in the home and requires portability.          Subjective:    Patient ID: Gasper Reddy is a 56 y.o. male.    HPI Comments: Chronic hypoxic respiratory failure - 2 lpm continuous. He is using his oxygen every day.  No new issues.  He is interested in starting using a concentrator today.  He has no adverse effects with the oxygen.    Pulmonary emphysema - no bronchitis or chest colds. He does bring up sputum and gets wheezy occasionally.  This is going well.  He is starting an oral.  It is well covered by his insurance.  He is not having  a lot of nighttime symptoms.      ART -he tolerated his titration study okay.  He thinks he will be able to wear the mask.  He is sleeping a little bit better and is wearing oxygen with sleep.    Tobacco use-she is smoking an occasional cigarette now.  He borrows it from his brother.  He is interested in cutting down and nothing.  He is not using oxygen when he smokes.      -- from previous  Chronic hypoxic respiratory failure - 3 lpm continuous.  This keeps his sats in 90s at rest and goes to 80s with activity.  He is using his oxygen every day.  No new issues.    Chronic diastolic heart failure - weighing himself daily.  Taking diuretics.  Good urine output. No new leg or flank swelling. No orthopnea.    Pulmonary emphysema - no bronchitis or chest colds. He does bring up sputum and gets wheezy occasionally.  He is using duonebs 3-4 times daily.  He has no PFTs in the past.    ART - very  Mild at 5.6, just above normal.  He tolerated sleep study fine, but did not try the mask because he needed full nights test.  His sleep is reasonable right now. He had a lot of trouble sleeping during the test.      --- from previous  56M here for follow up of acute hypoxic respiratory failure, pulmonary hypertension, hypoventilation.  He is doing better.  His breathing has improved somewhat, but he is very short of breath off of oxygen.  His dyspnea is worse with exertion.  It improves with rest.  Symptoms localize to the chest.  No other pertinent positives.  Pertinent negatives include no sputum no hemoptysis no leg swelling.    He has been taking his diuretics.  His weight has been stable.    [facility].    STOPBANG 8          Review of Systems   Constitutional: Positive for activity change.   HENT: Negative.    Eyes: Negative.    Respiratory: Positive for shortness of breath.    Cardiovascular: Negative.    Gastrointestinal: Negative.    Endocrine: Negative.    Genitourinary: Negative.    Skin: Negative.     Allergic/Immunologic: Negative.    Neurological: Negative.    Hematological: Negative.    Psychiatric/Behavioral: Positive for sleep disturbance.             Objective:    Physical Exam   Constitutional: He is oriented to person, place, and time. He appears well-developed and well-nourished. No distress.   HENT:   Head: Normocephalic.   Nose: Nose normal.   Mouth/Throat: Oropharynx is clear and moist. No oropharyngeal exudate.   Eyes: Pupils are equal, round, and reactive to light. Right eye exhibits no discharge. Left eye exhibits no discharge. No scleral icterus.   Neck: Normal range of motion. No JVD present. No tracheal deviation present. No thyromegaly present.   Cardiovascular: Normal rate and regular rhythm.  Exam reveals no gallop and no friction rub.    No murmur heard.  Pulmonary/Chest: Effort normal and breath sounds normal. No stridor. No respiratory distress. He has no wheezes. He has no rales.   Abdominal: Soft. Bowel sounds are normal. He exhibits no distension. There is no tenderness.   Musculoskeletal: He exhibits no edema or tenderness.   Lymphadenopathy:     He has no cervical adenopathy.   Neurological: He is alert and oriented to person, place, and time. No cranial nerve deficit.   Skin: Skin is warm and dry. No rash noted. He is not diaphoretic. No erythema. No pallor.   Psychiatric: He has a normal mood and affect. His behavior is normal. Judgment and thought content normal.           Current Outpatient Prescriptions on File Prior to Visit   Medication Sig Dispense Refill     albuterol (PROAIR HFA) 90 mcg/actuation inhaler Inhale 2 puffs every 4 (four) hours as needed. 1 Inhaler 11     albuterol (PROVENTIL) 2.5 mg /3 mL (0.083 %) nebulizer solution Take 3 mL (2.5 mg total) by nebulization every 4 (four) hours as needed for wheezing. 25 vial 11     aspirin 81 mg TbEF Take 81 mg by mouth once daily.        BLOOD SUGAR DIAGNOSTIC (TRUETEST TEST STRIPS MISC) USE 1 STRIP TWICE DAILY        ipratropium-albuterol (DUO-NEB) 0.5-2.5 mg/3 mL nebulizer Take 3 mL by nebulization 4 (four) times a day. 360 mL 11     losartan (COZAAR) 25 MG tablet Take 1 tablet (25 mg total) by mouth daily. 30 tablet 11     metFORMIN (GLUCOPHAGE) 500 MG tablet Take 1 tablet (500 mg total) by mouth daily with breakfast. 180 tablet 1     metFORMIN (GLUCOPHAGE) 500 MG tablet TAKE ONE TABLET BY MOUTH TWICE DAILY WITH MEALS 180 tablet 1     metoprolol succinate (TOPROL-XL) 25 MG Take 1 tablet (25 mg total) by mouth daily. 90 tablet 3     naproxen (NAPROSYN) 500 MG tablet 1 tablet daily.       tiotropium bromide (SPIRIVA RESPIMAT) 2.5 mcg/actuation Mist Inhale 2 puffs daily. 4 g 11     [] furosemide (LASIX) 40 MG tablet Take 1 tablet (40 mg total) by mouth 2 (two) times a day at 9am and 6pm. 180 tablet 3     No current facility-administered medications on file prior to visit.      There were no vitals taken for this visit.    Medical History  Active Ambulatory (Non-Hospital) Problems    Diagnosis     Chronic respiratory failure with hypoxia     Chronic diastolic congestive heart failure     Essential hypertension with goal blood pressure less than 140/90     Pulmonary hypertension     Respiratory failure     COPD (chronic obstructive pulmonary disease)     Carpal Tunnel Syndrome     Medial Epicondylitis     Nicotine Dependence     Ileus     Type 2 diabetes mellitus     Diarrhea     Past Medical History:   Diagnosis Date     COPD (chronic obstructive pulmonary disease)      Diabetes mellitus      Diverticulitis      Diverticulosis      HTN (hypertension)      Hypothyroid      S/P colon resection      Small bowel obstruction         Surgical History  He  has a past surgical history that includes Appendectomy.      Family history reviewed by me today.  He does not have family history of early emphysema but there is emphysema in the family in the setting of heavy smoking later in life.   Social History  Reviewed, and he is still  living with his brother who smokes.  He is working around the yard now.  He smokes an occasional cigarette.  He does not wear his oxygen when he smokes.     Allergies  No Known Allergies                           Data Review - imaging, labs, and ekgs listed below were reviewed by me.  Chest XRay and chest CT images and EKG tracings interpreted personally.     Past Labs  Appointment on 04/18/2017   Component Date Value     Hgb 04/18/2017 13.8        PFT raw data and tracings reviewed by me today.  On spirometry there is very severe obstruction with out response to bronchodilators.  Lung volumes show increased TLC and RV to TLC ratio consistent with air trapping.  Diffusion capacity is moderately reduced.    Titration sleep study reviewed.  Optimal levels of ventilation and oxygenation reached at a BiPAP of 13/7.  He recommended 1 L of oxygen per minute at night.

## 2021-06-10 NOTE — PROGRESS NOTES
Spoke with [facility] and they set the patient up with BiPAP on 04/20/17.  She also told me that once a patient gets approved for a battery operated concentrator that [facility] picks up all the rest of the equipment.  Due to the requirements of his BiPAP and his liter flow, he is not eligible for a portable oxygen concentrator.

## 2021-06-10 NOTE — PROGRESS NOTES
Patient oxygen saturation on RA at rest is 89-90%.  Oxygen saturation after ambulating 10ft on RA is 84%.    After ambulating 50ft on 2LPM oxygen saturation is 86%.  After ambulating 150ft on 3LPM oxygen saturation is 87%.  After ambulating 200ft on 4LPM oxygen saturation is 87%.    Patient is ambulatory within his/her home.     ...........................Sy Tommy CROSS 4/18/2017 10:42 AM

## 2021-06-10 NOTE — PROGRESS NOTES
Assessment: /    Plan:    1. Supraspinatus tendon tear, left, subsequent encounter  Ambulatory referral to Orthopedic Surgery       I used a Netter diagram to explain the anatomy of the shoulder, indicating the affected structures.    I called his pharmacy to check about his Ventolin prescription.  It had not been available to him because the pharmacy was temporarily out of stock.  It is available now.      Subjective:    HPI:  Marlon Reddy is a 56-year-old male presenting for follow-up on left shoulder MRI results.  There is partial-thickness tear of the supraspinatus tendon.  He also has infraspinatus and subscapularis tendinopathy, mild AC joint osteoarthritis and mild bursitis.        Review of Systems: No fever, no tingling of fingers.      Current Outpatient Prescriptions   Medication Sig Dispense Refill     albuterol (PROAIR HFA) 90 mcg/actuation inhaler Inhale 2 puffs every 4 (four) hours as needed. 18 g 11     albuterol (PROAIR HFA;PROVENTIL HFA;VENTOLIN HFA) 90 mcg/actuation inhaler Inhale 2 puffs every 4 (four) hours as needed for wheezing. 6.7 g 0     albuterol (PROVENTIL) 2.5 mg /3 mL (0.083 %) nebulizer solution Take 3 mL (2.5 mg total) by nebulization every 4 (four) hours as needed for wheezing. 25 vial 11     aspirin 81 mg TbEF Take 81 mg by mouth once daily.        BLOOD SUGAR DIAGNOSTIC (TRUETEST TEST STRIPS MISC) USE 1 STRIP TWICE DAILY       furosemide (LASIX) 40 MG tablet        losartan (COZAAR) 25 MG tablet Take 1 tablet (25 mg total) by mouth daily. 30 tablet 11     metFORMIN (GLUCOPHAGE) 500 MG tablet TAKE ONE TABLET BY MOUTH TWICE DAILY WITH MEALS 180 tablet 1     metoprolol succinate (TOPROL-XL) 25 MG Take 1 tablet (25 mg total) by mouth daily. 90 tablet 3     naproxen (NAPROSYN) 500 MG tablet 1 tablet daily.       tiotropium bromide (SPIRIVA RESPIMAT) 2.5 mcg/actuation Mist Inhale 2 puffs daily. 4 g 11     ipratropium-albuterol (DUO-NEB) 0.5-2.5 mg/3 mL nebulizer Take 3 mL by nebulization  4 (four) times a day 360 mL 0     No current facility-administered medications for this visit.          Objective:    Vitals:    05/16/17 1531   BP: 124/66   Pulse: 88   Resp: 20   Temp: 98.3  F (36.8  C)   SpO2: 96%       Gen:  NAD, VSS  Lungs:  normal  Heart:  normal          ADDITIONAL HISTORY SUMMARIZED (2): None.  DECISION TO OBTAIN EXTRA INFORMATION (1): None.   RADIOLOGY TESTS (1): Reviewed shoulder MRI.  LABS (1): None.  MEDICINE TESTS (1): None.  INDEPENDENT REVIEW (2 each): None.     Total Data Points: 1

## 2021-06-11 NOTE — PROGRESS NOTES
Assessment: /    Plan:    1. Type 2 diabetes mellitus without complication, without long-term current use of insulin (H)  Glycosylated Hemoglobin A1c    JI RED   2. Pulmonary emphysema, unspecified emphysema type (H)     3. Essential hypertension with goal blood pressure less than 140/90     4. Advanced directives, counseling/discussion         He brought the original of his healthcare directive.    He had previously brought in a copy that had an explanation of #3, that if he were brain-dead, do not use machines permanently.  The explanation is not needed, because #3 states that he would not want treatment to continue if it is no longer helpful.    Recheck in 6 months, or sooner if any problem.      Subjective:    HPI:  Marlon Reddy is a 60-year-old male presenting for follow-up on diabetes.  Diabetes is diet controlled.    He was in [place], [place] with family members, and had COPD exacerbation.  He was hospitalized September 8 to September 12.  He is on a steroid taper, taking prednisone 40 mg daily x3 days, 30×3, 20×3, 10×3, 5×3.  He has been feeling better.  He uses CPAP.    He has hypertension, taking losartan and metoprolol.      Social Hx: He has not smoked since March.    Review of Systems: No fever or rash.      Current Outpatient Medications   Medication Sig Dispense Refill     albuterol (PROAIR HFA;PROVENTIL HFA;VENTOLIN HFA) 90 mcg/actuation inhaler Inhale 2 puffs every 4 (four) hours as needed for wheezing. 6.7 g 0     albuterol (PROVENTIL) 2.5 mg /3 mL (0.083 %) nebulizer solution USE 1 VIAL IN NEBULIZER EVERY 4 HOURS AS NEEDED FOR WHEEZING 360 mL 11     aspirin 81 mg TbEF Take 81 mg by mouth once daily.        BLOOD SUGAR DIAGNOSTIC (TRUETEST TEST STRIPS MISC) USE 1 STRIP TWICE DAILY       furosemide (LASIX) 40 MG tablet Take 1 tablet by mouth twice daily 180 tablet 1     losartan (COZAAR) 25 MG tablet TAKE 1 TABLET BY MOUTH ONCE DAILY 90 tablet 3     metoprolol succinate (TOPROL-XL) 25 MG Take 1  tablet by mouth once daily 90 tablet 2     multivitamin therapeutic tablet Take 1 tablet by mouth daily.       naproxen sodium (ALEVE) 220 MG tablet Take 220 mg by mouth 2 (two) times a day as needed for pain.       OXYGEN-AIR DELIVERY SYSTEMS [facility] Use 3 L As Directed daily. 3l per n/c during the day and 1.5 L at bed time   [facility]       STIOLTO RESPIMAT 2.5-2.5 mcg/actuation Mist inhaler INHALE 2 PUFFS INTO LUNGS ONCE DAILY 4 g 11     varenicline (CHANTIX STARTING MONTH BOX) 0.5 mg (11)- 1 mg (42) tablet 1 wk before you stop smoking take 0.5mg daily on days 1-3, 0.5mg 2 times each day on days 4-7, then 1mg 2 times daily. 53 tablet 0     varenicline (CHANTIX) 1 mg tablet Take 1 tab by mouth two times a day. Take after eating with a full glass of water. NOTE: Dispense as maintenance for refills only. 60 tablet 2     No current facility-administered medications for this visit.          Objective:    Vitals:    09/15/20 1007   BP: 126/68   Pulse: 82   Resp: 24   Temp: 98.6  F (37  C)   SpO2: 99%       Gen:  NAD, VSS  Lungs:  normal  Heart:  normal    A1c is 5.9      ADDITIONAL HISTORY SUMMARIZED (2): Reviewed hospital records in care everywhere.  DECISION TO OBTAIN EXTRA INFORMATION (1): None.   RADIOLOGY TESTS (1): None.  LABS (1): A1c.  MEDICINE TESTS (1): None.  INDEPENDENT REVIEW (2 each): None.     Total Data Points: 3

## 2021-06-11 NOTE — TELEPHONE ENCOUNTER
Refill Approved    Rx renewed per Medication Renewal Policy. Medication was last renewed on 3/3/20.    Jackie Myers, [facility] Triage/Med Refill 9/30/2020     Requested Prescriptions   Pending Prescriptions Disp Refills     furosemide (LASIX) 40 MG tablet [Pharmacy Med Name: Furosemide 40 MG Oral Tablet] 180 tablet 0     Sig: Take 1 tablet by mouth twice daily       Diuretics/Combination Diuretics Refill Protocol  Passed - 9/27/2020  1:34 PM        Passed - Visit with PCP or prescribing provider visit in past 12 months     Last office visit with prescriber/PCP: 9/15/2020 Dustin Mariano MD OR same dept: 9/15/2020 Dustin Mariano MD OR same specialty: 9/15/2020 Dustin Mariano MD  Last physical: Visit date not found Last MTM visit: Visit date not found   Next visit within 3 mo: Visit date not found  Next physical within 3 mo: Visit date not found  Prescriber OR PCP: Dustin Mariano MD  Last diagnosis associated with med order: 1. Chronic diastolic congestive heart failure (H)  - furosemide (LASIX) 40 MG tablet [Pharmacy Med Name: Furosemide 40 MG Oral Tablet]; Take 1 tablet by mouth twice daily  Dispense: 180 tablet; Refill: 0    If protocol passes may refill for 12 months if within 3 months of last provider visit (or a total of 15 months).             Passed - Serum Potassium in past 12 months      Lab Results   Component Value Date    Potassium 4.2 03/11/2020             Passed - Serum Sodium in past 12 months      Lab Results   Component Value Date    Sodium 141 03/11/2020             Passed - Blood pressure on file in past 12 months     BP Readings from Last 1 Encounters:   09/15/20 126/68             Passed - Serum Creatinine in past 12 months      Creatinine   Date Value Ref Range Status   03/11/2020 0.79 0.70 - 1.30 mg/dL Final

## 2021-06-12 NOTE — PROGRESS NOTES
Assessment/Plan:        Diagnoses and all orders for this visit:    Pulmonary emphysema, unspecified emphysema type  -     umeclidinium (INCRUSE ELLIPTA) 62.5 mcg/actuation DsDv inhaler; Inhale 1 puff daily.  Dispense: 1 each; Refill: 12    Chronic respiratory failure with hypoxia    Chronic diastolic congestive heart failure    Dyspnea on exertion    Obstructive sleep apnea     56-year-old man with severe COPD, chronic respiratory failure obstructive sleep apnea likely pulmonary hypertension here for follow-up.  He continues to slowly improve.    Medication Plan  Stop Spiriva  Start Incruse (this replaces spiriva)    Continue Nebulizers  Duonebs will double up on your incruse.  If you get too much dry mouth just use albuterol nebulizer alone.    Sleep Plan  Continue BiPAP every night.    Exercise Prescription  15 minutes on the bike every day.  Once that gets so easy that it is stupid, increase to 20.  You might get addicted.    Oxygen Plan  Continue on your current plan. Rosa.    Continue diuretics and daily weights.          Subjective:    Patient ID: Gasper Reddy is a 57 y.o. male.    HPI Comments: Doing well.    Breathing is maybe a little bit better.  Walks up and down stairs a little bit better.  His primary symptom related to breathing and shortness of breath.  It is worse with exertion.  It is better with rest.  It localized to his chest.  He also has some improvement in his symptoms with DuoNeb's.  No pertinent positives.  Pertinent negatives include no hemoptysis.  No leg swelling.    He continues to use oxygen at 4 pulse with activity, 2-3 with rest.  No issues with the oxygen.    Weight is about stable.    Still brings up a small amount of sputum.  This is decreased.  He has not had no episodes of bronchitis or pneumonia.    His sleep is stable.  He uses Bipap 13/7.  Uses every night.  It usually helps his sleep.  He is having no problems with mask fit or other issues.    Not smoking  cigarettes.    --- from previous    Chronic hypoxic respiratory failure - 2 lpm continuous. He is using his oxygen every day.  No new issues.  He is interested in starting using a concentrator today.  He has no adverse effects with the oxygen.    Pulmonary emphysema - no bronchitis or chest colds. He does bring up sputum and gets wheezy occasionally.  This is going well.  He is starting an oral.  It is well covered by his insurance.  He is not having a lot of nighttime symptoms.      ART -he tolerated his titration study okay.  He thinks he will be able to wear the mask.  He is sleeping a little bit better and is wearing oxygen with sleep.    Tobacco use-she is smoking an occasional cigarette now.  He borrows it from his brother.  He is interested in cutting down and nothing.  He is not using oxygen when he smokes.      -- from previous  Chronic hypoxic respiratory failure - 3 lpm continuous.  This keeps his sats in 90s at rest and goes to 80s with activity.  He is using his oxygen every day.  No new issues.    Chronic diastolic heart failure - weighing himself daily.  Taking diuretics.  Good urine output. No new leg or flank swelling. No orthopnea.    Pulmonary emphysema - no bronchitis or chest colds. He does bring up sputum and gets wheezy occasionally.  He is using duonebs 3-4 times daily.  He has no PFTs in the past.    ART - very  Mild at 5.6, just above normal.  He tolerated sleep study fine, but did not try the mask because he needed full nights test.  His sleep is reasonable right now. He had a lot of trouble sleeping during the test.      --- from previous  56M here for follow up of acute hypoxic respiratory failure, pulmonary hypertension, hypoventilation.  He is doing better.  His breathing has improved somewhat, but he is very short of breath off of oxygen.  His dyspnea is worse with exertion.  It improves with rest.  Symptoms localize to the chest.  No other pertinent positives.  Pertinent negatives  include no sputum no hemoptysis no leg swelling.    He has been taking his diuretics.  His weight has been stable.    [facility].    [facility] 8          Review of Systems             Objective:    Physical Exam   Constitutional: He is oriented to person, place, and time. He appears well-developed and well-nourished. No distress.   HENT:   Head: Normocephalic.   Nose: Nose normal.   Mouth/Throat: Oropharynx is clear and moist. No oropharyngeal exudate.   Eyes: Pupils are equal, round, and reactive to light. Right eye exhibits no discharge. Left eye exhibits no discharge. No scleral icterus.   Neck: Normal range of motion. No JVD present. No tracheal deviation present. No thyromegaly present.   Cardiovascular: Normal rate and regular rhythm.  Exam reveals no gallop and no friction rub.    No murmur heard.  Pulmonary/Chest: Effort normal and breath sounds normal. No stridor. No respiratory distress. He has no wheezes. He has no rales.   Abdominal: Soft. Bowel sounds are normal. He exhibits no distension. There is no tenderness.   Musculoskeletal: He exhibits no edema or tenderness.   Lymphadenopathy:     He has no cervical adenopathy.   Neurological: He is alert and oriented to person, place, and time. No cranial nerve deficit.   Skin: Skin is warm and dry. No rash noted. He is not diaphoretic. No erythema. No pallor.   Psychiatric: He has a normal mood and affect. His behavior is normal. Judgment and thought content normal.           Current Outpatient Prescriptions on File Prior to Visit   Medication Sig Dispense Refill     albuterol (PROAIR HFA) 90 mcg/actuation inhaler Inhale 2 puffs every 4 (four) hours as needed. 18 g 11     albuterol (PROAIR HFA;PROVENTIL HFA;VENTOLIN HFA) 90 mcg/actuation inhaler Inhale 2 puffs every 4 (four) hours as needed for wheezing. 6.7 g 0     albuterol (PROVENTIL) 2.5 mg /3 mL (0.083 %) nebulizer solution Take 3 mL (2.5 mg total) by nebulization every 4 (four) hours as needed for  wheezing. 25 vial 11     aspirin 81 mg TbEF Take 81 mg by mouth once daily.        BLOOD SUGAR DIAGNOSTIC (TRUETEST TEST STRIPS MISC) USE 1 STRIP TWICE DAILY       furosemide (LASIX) 40 MG tablet        ipratropium-albuterol (DUO-NEB) 0.5-2.5 mg/3 mL nebulizer USE ONE AMPULE IN NEBULIZER 4 TIMES DAILY 360 vial 1     losartan (COZAAR) 25 MG tablet Take 1 tablet (25 mg total) by mouth daily. 30 tablet 11     metFORMIN (GLUCOPHAGE) 500 MG tablet TAKE ONE TABLET BY MOUTH TWICE DAILY WITH MEALS 180 tablet 1     metoprolol succinate (TOPROL-XL) 25 MG Take 1 tablet (25 mg total) by mouth daily. 90 tablet 3     naproxen (NAPROSYN) 500 MG tablet 1 tablet daily.       tiotropium bromide (SPIRIVA RESPIMAT) 2.5 mcg/actuation Mist Inhale 2 puffs daily. 4 g 11     No current facility-administered medications on file prior to visit.      /74  Pulse 86  Resp 18  Wt (!) 242 lb (109.8 kg)  SpO2 94% Comment: 4LPM  BMI 35.74 kg/m2    Medical History  Active Ambulatory (Non-Hospital) Problems    Diagnosis     Type 2 diabetes mellitus without complication, without long-term current use of insulin     Chronic respiratory failure with hypoxia     Chronic diastolic congestive heart failure     Essential hypertension with goal blood pressure less than 140/90     Pulmonary hypertension     Respiratory failure     COPD (chronic obstructive pulmonary disease)     Carpal Tunnel Syndrome     Medial Epicondylitis     Nicotine Dependence     Ileus     Diarrhea     Past Medical History:   Diagnosis Date     COPD (chronic obstructive pulmonary disease)      Diabetes mellitus      Diverticulitis      Diverticulosis      HTN (hypertension)      Hypothyroid      S/P colon resection      Small bowel obstruction         Surgical History  He  has a past surgical history that includes Appendectomy.      Family history reviewed by me today.  He does not have family history of early emphysema but there is emphysema in the family in the setting of  heavy smoking later in life.   Social History  Reviewed, he still living with his brother who smokes.  He works outside some degree.  He is no longer smoking and occasional cigarette.  He is supported by inheritance.   Allergies  No Known Allergies                           Data Review - imaging, labs, and ekgs listed below were reviewed by me.  Chest XRay and chest CT images and EKG tracings interpreted personally.     Past Labs  No visits with results within 2 Month(s) from this visit.  Latest known visit with results is:    Office Visit on 04/28/2017   Component Date Value     Hemoglobin A1c 04/28/2017 5.5      Outside records from [facility] reviewed.  Requested by me.  Compliance greater than 4 hours 97% of days, 7 hours use per day.  Residual AHI 1.5.

## 2021-06-12 NOTE — PROGRESS NOTES
Orders sent to [facility] for larger home O2 concentrator.Said he has been using at least 5 liters  Per N/C when up and around the house.

## 2021-06-12 NOTE — TELEPHONE ENCOUNTER
Refill Approved    Rx renewed per Medication Renewal Policy. Medication was last renewed on 10/24/19.    Jackie Myers, [facility] Triage/Med Refill 10/23/2020     Requested Prescriptions   Pending Prescriptions Disp Refills     losartan (COZAAR) 25 MG tablet [Pharmacy Med Name: Losartan Potassium 25 MG Oral Tablet] 90 tablet 0     Sig: Take 1 tablet by mouth once daily       Angiotensin Receptor Blocker Protocol Passed - 10/21/2020 11:14 AM        Passed - PCP or prescribing provider visit in past 12 months       Last office visit with prescriber/PCP: 9/15/2020 Dustin Mariano MD OR same dept: 9/15/2020 Dustin Mariano MD OR same specialty: 9/15/2020 Dustin Mariano MD  Last physical: Visit date not found Last MTM visit: Visit date not found   Next visit within 3 mo: Visit date not found  Next physical within 3 mo: Visit date not found  Prescriber OR PCP: Dustin Mariano MD  Last diagnosis associated with med order: 1. Essential hypertension with goal blood pressure less than 140/90  - losartan (COZAAR) 25 MG tablet [Pharmacy Med Name: Losartan Potassium 25 MG Oral Tablet]; TAKE 1 TABLET BY MOUTH ONCE DAILY  Dispense: 90 tablet; Refill: 0    If protocol passes may refill for 12 months if within 3 months of last provider visit (or a total of 15 months).             Passed - Serum potassium within the past 12 months     Lab Results   Component Value Date    Potassium 4.2 03/11/2020             Passed - Blood pressure filed in past 12 months     BP Readings from Last 1 Encounters:   09/15/20 126/68             Passed - Serum creatinine within the past 12 months     Creatinine   Date Value Ref Range Status   03/11/2020 0.79 0.70 - 1.30 mg/dL Final

## 2021-06-13 NOTE — PROGRESS NOTES
"Gasper Reddy is a 60 y.o. male who is being evaluated via a billable video visit.      The patient has been notified of following:     \"This video visit will be conducted via a call between you and your physician/provider. We have found that certain health care needs can be provided without the need for an in-person physical exam.  This service lets us provide the care you need with a video conversation.  If a prescription is necessary we can send it directly to your pharmacy.  If lab work is needed we can place an order for that and you can then stop by our lab to have the test done at a later time.    Video visits are billed at different rates depending on your insurance coverage. Please reach out to your insurance provider with any questions.    If during the course of the call the physician/provider feels a video visit is not appropriate, you will not be charged for this service.\"    Patient has given verbal consent to a Video visit? Yes  How would you like to obtain your AVS? AVS Preference: MyChart.  If dropped by the video visit, the video invitation should be sent to: Text to cell phone: [phone]  Will anyone else be joining your video visit? No        Telephone visit with Marlon today.  Overall his breathing is stable.  He had an admission in [place] in the setting of a significant temperature change and he was there for 4 days.  Overall he is feeling better.  He stopped smoking.  He is been requiring higher levels of oxygen with activity and so as continuing to wear his oxygen and had his oxygen system updated.  He is wearing his BiPAP regularly.    Overall emphysema with severe sleep apnea, overall doing okay.  We will give him an action plan and consider further therapies if he has recurrent exacerbations.  Otherwise doing okay right now.    He is not the type that seems like he would benefit from inhaled steroid.  Can reevaluate this if he has recurrent exacerbations.    12-minute spent on this " telephone visit.    Laron Salas

## 2021-06-14 NOTE — TELEPHONE ENCOUNTER
Meg from [facility] calling requesting callback. She needs confirmation that PCP will f/u with homecare through phone and fax. Her callback # is [phone].

## 2021-06-14 NOTE — PROGRESS NOTES
[facility] Follow-up Note    Assessment & Plan        1. Chronic diastolic congestive heart failure -no signs or symptoms on exam currently.  He knows to limit salt and fluids.  Checking his weight daily and has furosemide.  Continue same.     2. Chronic respiratory failure with hypoxia -he is now using oxygen anywhere from 1-3 L nasal cannula.   3. Pulmonary emphysema, unspecified emphysema type -significant COPD and he sees pulmonary and is on 2 separate nebulizers as well as 2 separate inhalers as well as oxygen.  He is living with his brother who is still smoking and he does smell from tobacco.   4. Essential hypertension with goal blood pressure less than 140/90 -under good control currently on metoprolol but not sure if I like him on this medication.   5. Pulmonary hypertension -based on right ventricular enlargement and weakness suspect he does have pulmonary hypertension due to sleep apnea as well as COPD.  Stable at this point time.   6. Obstructive sleep apnea -he uses BiPAP.   7. Type 2 diabetes mellitus without complication, without long-term current use of insulin -defer to primary.   8.  Right ventricular dysfunction-as above due to COPD and sleep apnea and on BiPAP.  9.  Obesity-weight loss.    Plan  1.  Continue current fluid and salt restriction.  2.  Continue current weight loss.  3.  Follow-up with me in about 8 months or sooner if needed.  4.  Consider ischemic evaluation although he would never be able to tolerate a stress test.    Subjective  CC: 57-year-old white gentleman being seen in six-month follow-up today.  He is now using oxygen and states his breathing is better and he is exercising on a stationary bike at home.  There is no PND, orthopnea, peripheral edema, chest discomfort, palpitations, syncope or dizziness.  He does have some orthostatic lightheadedness if he stays bent over for quite some time.    Medications  Current Outpatient Prescriptions   Medication Sig  "Note     albuterol (PROAIR HFA;PROVENTIL HFA;VENTOLIN HFA) 90 mcg/actuation inhaler Inhale 2 puffs every 4 (four) hours as needed for wheezing.      albuterol (PROVENTIL) 2.5 mg /3 mL (0.083 %) nebulizer solution Take 3 mL (2.5 mg total) by nebulization every 4 (four) hours as needed for wheezing.      aspirin 81 mg TbEF Take 81 mg by mouth once daily.       BLOOD SUGAR DIAGNOSTIC (TRUETEST TEST STRIPS MISC) USE 1 STRIP TWICE DAILY      furosemide (LASIX) 40 MG tablet Take 40 mg by mouth daily.  5/16/2017: Received from: External Pharmacy     ipratropium-albuterol (DUO-NEB) 0.5-2.5 mg/3 mL nebulizer USE ONE VIAL IN NEBULIZER 4 TIMES DAILY      losartan (COZAAR) 25 MG tablet Take 1 tablet (25 mg total) by mouth daily.      metFORMIN (GLUCOPHAGE) 500 MG tablet TAKE ONE TABLET BY MOUTH TWICE DAILY WITH MEALS (Patient taking differently: TAKE ONE TABLET BY MOUTH DAILY WITH MEALS)      metoprolol succinate (TOPROL-XL) 25 MG Take 1 tablet (25 mg total) by mouth daily.      naproxen (NAPROSYN) 500 MG tablet 1 tablet daily.      umeclidinium (INCRUSE ELLIPTA) 62.5 mcg/actuation DsDv inhaler Inhale 1 puff daily. (Patient taking differently: Inhale 1 puff as needed. )        Objective  /78 (Patient Site: Left Arm, Patient Position: Sitting, Cuff Size: Adult Large)  Pulse 76  Resp 16  Ht 5' 9\" (1.753 m)  Wt (!) 233 lb (105.7 kg)  BMI 34.41 kg/m2    General Appearance:    Alert, cooperative, mild respiratory distress, appears stated age on oxygen   Head:    Normocephalic, without obvious abnormality, atraumatic   Throat:   Lips, mucosa, and tongue normal; teeth and gums normal   Neck:   Supple, symmetrical, trachea midline, no adenopathy;        thyroid:  No enlargement/tenderness/nodules; no carotid    bruit or JVD   Back:     Symmetric, no curvature, ROM normal, no CVA tenderness   Lungs:    Decreased breath sounds bilaterally, respirations unlabored   Chest wall:    No tenderness or deformity   Heart:    Regular " rate and rhythm, S1 and S2 normal, no murmur, rub   or gallop, PMI shifted medially   Abdomen:     Soft, non-tender, bowel sounds active all four quadrants,     no masses, no organomegaly   Extremities:   Normal, atraumatic, no cyanosis or edema   Pulses:   2+ and symmetric all extremities   Skin:   Skin color, texture, turgor normal, no rashes or lesions     Results    Lab Results personally reviewed   Lab Results   Component Value Date    CHOL 193 10/17/2017    CHOL 162 10/28/2016     Lab Results   Component Value Date    HDL 45 10/17/2017    HDL 42 10/28/2016     Lab Results   Component Value Date    LDLCALC 115 10/17/2017    LDLCALC 95 10/28/2016     Lab Results   Component Value Date    TRIG 163 (H) 10/17/2017    TRIG 126 10/28/2016     Lab Results   Component Value Date    WBC 10.4 01/03/2017    HGB 18.6 (H) 01/03/2017    HCT 57.0 (H) 01/03/2017     01/03/2017     Lab Results   Component Value Date    CREATININE 0.74 10/17/2017    BUN 16 10/17/2017     10/17/2017    K 4.2 10/17/2017    CO2 37 (H) 10/17/2017     Review of Systems:   General: WNL  Eyes: WNL  Ears/Nose/Throat: WNL  Lungs: Cough, Shortness of Breath, Wheezing  Heart: Shortness of Breath with activity  Stomach: WNL  Bladder: WNL  Muscle/Joints: WNL  Skin: WNL  Nervous System: WNL  Mental Health: WNL     Blood: WNL

## 2021-06-14 NOTE — TELEPHONE ENCOUNTER
Phone katie from patient. Would like to get a new Bipap machine??  Asking for orders to be sent to [facility].    Dr. Salas:  Do you want to see patient before ordering.  He is not sure of settings and last orders were from 2017.   He does not think his settings are still the same??

## 2021-06-15 PROBLEM — E11.9 TYPE 2 DIABETES MELLITUS WITHOUT COMPLICATION, WITHOUT LONG-TERM CURRENT USE OF INSULIN (H): Status: ACTIVE | Noted: 2017-04-28

## 2021-06-15 PROBLEM — I10 ESSENTIAL HYPERTENSION WITH GOAL BLOOD PRESSURE LESS THAN 140/90: Status: ACTIVE | Noted: 2017-01-03

## 2021-06-15 PROBLEM — J96.11 CHRONIC RESPIRATORY FAILURE WITH HYPOXIA AND HYPERCAPNIA (H): Status: ACTIVE | Noted: 2017-02-28

## 2021-06-15 PROBLEM — J96.12 CHRONIC RESPIRATORY FAILURE WITH HYPOXIA AND HYPERCAPNIA (H): Status: ACTIVE | Noted: 2017-02-28

## 2021-06-15 PROBLEM — I50.32 CHRONIC DIASTOLIC HEART FAILURE WITH PRESERVED EJECTION FRACTION (H): Status: ACTIVE | Noted: 2017-01-03

## 2021-06-15 NOTE — TELEPHONE ENCOUNTER
RN cannot approve Refill Request    RN can NOT refill this medication med is not covered by policy/route to provider. Last office visit: 2/2/2021 Dustin Mariano MD Last Physical: Visit date not found Last MTM visit: Visit date not found Last visit same specialty: 2/2/2021 Dustin Mariano MD.  Next visit within 3 mo: Visit date not found  Next physical within 3 mo: Visit date not found      Erma Dixon, [facility] Triage/Med Refill 2/10/2021    Requested Prescriptions   Pending Prescriptions Disp Refills     STIOLTO RESPIMAT 2.5-2.5 mcg/actuation Mist inhaler [Pharmacy Med Name: Stiolto Respimat 2.5-2.5 MCG/ACT Inhalation Aerosol Solution] 4 g 0     Sig: INHALE 2 PUFFS BY MOUTH ONCE DAILY       There is no refill protocol information for this order

## 2021-06-15 NOTE — ANESTHESIA PREPROCEDURE EVALUATION
Anesthesia Evaluation        Airway    Pulmonary    (+) COPD severe, sleep apnea (BiPAP nightly ) on other, , a smoker (quit smoking 4 months ago)    ROS comment: PET CT 2/18/21:  FINDINGS: Spiculated FDG avid nodule in the left upper lobe measuring 1.6 x 2.2 cm (max SUV 7.7) and spiculated nodule in the right infrahilar region measuring 2.9 x 2.1 cm (max SUV 7.6) with FDG avid left greater than right interlobar station 11   resulting in near complete occlusion of the left upper lobe bronchi (max SUV 7.2), subcarinal station 7 (max SUV 7.3), left lower paratracheal station 4L (max SUV 4.1), subaortic/para-aortic station 5/6 (max SUV 3.5) lymph nodes suspicious for   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 The remaining FDG uptake is physiologic from the skull base to mid thigh.     Mild coronary artery calcium. Severe upper lung predominant emphysema. Sigmoid diverticulosis. Pelvic phleboliths. Moderate prostamegaly. Multilevel degenerative changes of the spine.    Patient was hospitalized December 31 - January 6 due to respiratory failure with hypoxia and hypercapnia.                         Cardiovascular      ROS comment: Chronic diastolic heart failure with preserved ejection fraction     Neuro/Psych      Endo/Other       Comments: Has been on prednisone 20 mg daily since January. Last home dose **    GI/Hepatic/Renal       Other findings:   COVID negative 2/28/21      Dental                         Anesthesia Plan

## 2021-06-15 NOTE — PROGRESS NOTES
"I called and informed [facility] to call pt to check on his BiPap machine per message from Dr. Salas.      \"Can you let [facility] know that his machine is malfunctioning?  Specifically the buttons for heat and humidity.\"    They will call pt to set up a time to look at his machine.  "

## 2021-06-15 NOTE — PATIENT INSTRUCTIONS - HE
Called patient to go over EBUS education. Informed patient that his procedure is scheduled for 3/4 at 8:00am in the OR at [facility].  Instructed to arrive at 6:00am.  An IV will be placed and IV sedation will be given.  Biopsies may be done.  Instructed to have nothing to eat or drink after midnight.   Medication instructions: Hold all morning medications the day of the procedure, except metoprolol (take with small sip of water). Inhalers are okay to use.   Stop Aspirin date:  Stop 5 days prior to procedure   Stop Ibuprofen, NSAIDS, coxibs (ie:celebrex):  Stop naproxen 5 days prior to procedure  Stop blood thinner date: n/a  Patient understands that they will need a ride home after the procedure and someone to stay with them at home after the procedure.  Patient had no further questions and is agreeable to procedure.  Phone number given for questions. Patient understands they are required to have COVID testing done prior to procedure.

## 2021-06-15 NOTE — TELEPHONE ENCOUNTER
Sary from [facility] Medical Examiner's office called to get verbal confirmation that PCP will sign death certificate. Will need this soon. Call back number for verbal is [phone].

## 2021-06-15 NOTE — TELEPHONE ENCOUNTER
Reason for Call:  Other sign death certificate     Detailed comments: Haseeb from [facility] medical examiner's office call to report pt passed away this morning at 5:07am.  responded. Pt had recent heart surgery. Cause of death - natural. Wants to confirm if Dr. Mariano willing to sign death certificate. Ok to wait for Dr. Mariano tomorrow.    Phone Number Patient can be reached at: Other phone number:  [phone]    Best Time: business hours    Can we leave a detailed message on this number?: No    Call taken on 3/4/2021 at 10:35 AM by Regla Chávez

## 2021-06-15 NOTE — PROGRESS NOTES
Gasper Reddy is a 60 y.o. male who is being evaluated via a billable telephone visit.      What phone number would you like to be contacted at? [phone]  How would you like to obtain your AVS? AVS Preference: Mail a copy.    60-year-old man with very advanced emphysema, chronic hypoxic respiratory failure status post recent admission and prolonged healthcare stay for COPD exacerbation here for follow-up.    He did nodule on his chest x-ray and a CT scan was found to have multiple masses concerning for lung cancer.    He is feeling better since his hospital stay but still bringing up some sputum.    He is using his BiPAP every night.    He has been on prednisone 20 mg since leaving the hospital.    He is not having any hypoglycemia.    He is still using his medications as listed.    We discussed the unfortunately his diagnosis is concerning for lung cancer.  He is at increased risk for requiring admission or other complications from biopsy at his baseline currently is more susceptible during his recovery.  Because of this I have ordered a PET scan to be done to help guide biopsy planning.  Either will identify an extrapulmonary site or can make his bronchoscopy faster and more straightforward.    He had a consultation with palliative care in the hospital but currently is interested in moving forward with diagnostics.    I will call him with his PET scan results.    26 minutes spent on this telephone visit.

## 2021-06-15 NOTE — PROGRESS NOTES
"    Assessment & Plan     Gasper was seen today for follow-up and equipment request.    Diagnoses and all orders for this visit:    Chronic respiratory failure with hypoxia and hypercapnia (H)  -     [place]    Chronic diastolic heart failure with preserved ejection fraction (H)    Type 2 diabetes mellitus without complication, without long-term current use of insulin (H)      He will have pulmonology appointment with Dr. Salas.           BMI:   Estimated body mass index is 31.25 kg/m  as calculated from the following:    Height as of this encounter: 5' 7\" (1.702 m).    Weight as of this encounter: 199 lb 8 oz (90.5 kg).       Return in about 6 months (around 8/2/2021) for DM.    Dustin Mariano MD  [facility]   Gasper Reddy is 60 y.o. and presents today for the following health issues   HPI     He was at [facility] December 31 - January 6 due to respiratory failure with hypoxia and hypercapnia.  He also has chronic diastolic heart failure with preserved ejection fraction.  He has diabetes.  A1c was 5.5 on December 31.  He had urinary retention on January 7 and was admitted overnight.  He was then at a TCU from January 8 to January 22.    He is using oxygen at 4 L/min by nasal cannula.    He has not smoked in the past 4 months.    He requests a shower chair because he becomes short of breath during the shower, and unable to stand for very long.      Current Outpatient Medications   Medication Sig Dispense Refill     albuterol (PROAIR HFA;PROVENTIL HFA;VENTOLIN HFA) 90 mcg/actuation inhaler Inhale 2 puffs every 4 (four) hours as needed for wheezing. 6.7 g 11     aspirin 81 mg TbEF Take 81 mg by mouth once daily.        furosemide (LASIX) 40 MG tablet Take 1 tablet by mouth twice daily 180 tablet 1     losartan (COZAAR) 25 MG tablet Take 1 tablet by mouth once daily 90 tablet 1     metoprolol succinate (TOPROL-XL) 25 MG Take 1 tablet by mouth once daily 90 tablet " "2     multivitamin therapeutic tablet Take 1 tablet by mouth daily.       naproxen sodium (ALEVE) 220 MG tablet Take 220 mg by mouth 2 (two) times a day as needed for pain.       OXYGEN-AIR DELIVERY SYSTEMS [facility] Use 3 L As Directed daily. 3l per n/c during the day and 1.5 L at bed time   [facility]       albuterol (PROVENTIL) 2.5 mg /3 mL (0.083 %) nebulizer solution USE 1 VIAL IN NEBULIZER EVERY 4 HOURS AS NEEDED FOR WHEEZING 300 mL 0     azithromycin (ZITHROMAX) 250 MG tablet Take 500 mg (2 x 250 mg tablets) on day 1 followed by 250 mg (1 tablet) on days 2-5. 6 tablet 3     BLOOD SUGAR DIAGNOSTIC (TRUETEST TEST STRIPS MISC) USE 1 STRIP TWICE DAILY       predniSONE (DELTASONE) 20 MG tablet Take 20 mg by mouth daily. 7 tablet 3     STIOLTO RESPIMAT 2.5-2.5 mcg/actuation Mist inhaler INHALE 2 PUFFS BY MOUTH ONCE DAILY 4 g 11     varenicline (CHANTIX STARTING MONTH BOX) 0.5 mg (11)- 1 mg (42) tablet 1 wk before you stop smoking take 0.5mg daily on days 1-3, 0.5mg 2 times each day on days 4-7, then 1mg 2 times daily. 53 tablet 0     varenicline (CHANTIX) 1 mg tablet Take 1 tab by mouth two times a day. Take after eating with a full glass of water. NOTE: Dispense as maintenance for refills only. 60 tablet 2     No current facility-administered medications for this visit.          Review of Systems  No fever or sputum production.      Objective    /64 (Patient Site: Left Arm, Patient Position: Sitting, Cuff Size: Adult Regular)   Pulse 94   Temp 98.6  F (37  C) (Oral)   Ht 5' 7\" (1.702 m)   Wt 199 lb 8 oz (90.5 kg)   SpO2 98%   BMI 31.25 kg/m    Body mass index is 31.25 kg/m .  Physical Exam  Heart normal  Lungs with distant breath sounds              "

## 2021-06-16 PROBLEM — G47.33 OBSTRUCTIVE SLEEP APNEA: Status: ACTIVE | Noted: 2017-08-10

## 2021-06-16 PROBLEM — R09.02 HYPOXIA: Status: ACTIVE | Noted: 2020-01-01

## 2021-06-16 PROBLEM — R59.0 MEDIASTINAL LYMPHADENOPATHY: Status: ACTIVE | Noted: 2021-01-01

## 2021-06-16 PROBLEM — E78.2 MIXED HYPERLIPIDEMIA: Status: ACTIVE | Noted: 2018-04-17

## 2021-06-16 PROBLEM — G47.33 OSA (OBSTRUCTIVE SLEEP APNEA): Status: ACTIVE | Noted: 2020-01-01

## 2021-06-16 PROBLEM — Z71.89 OTHER SPECIFIED COUNSELING: Status: ACTIVE | Noted: 2019-04-23

## 2021-06-16 NOTE — PROGRESS NOTES
Patient oxygen saturation on RA at rest is 90%.  Oxygen saturation after ambulating 100ft on RA is 87%.    After ambulating 150ft on 2LPM oxygen saturation is 88%.  After ambulating 200ft on 3LPM oxygen saturation is 88%.  After ambulating 300ft on 4LPM oxygen saturation is 89%.    DME Provider: [facility]    Patient is ambulatory within his/her home.

## 2021-06-16 NOTE — PROGRESS NOTES
Assessment/Plan:        Diagnoses and all orders for this visit:    Chronic respiratory failure with hypoxia  -     Check Pulse Oximetry while ambulating    Pulmonary emphysema, unspecified emphysema type  -     albuterol (PROVENTIL) 2.5 mg /3 mL (0.083 %) nebulizer solution; Take 3 mL (2.5 mg total) by nebulization every 4 (four) hours as needed for wheezing.  Dispense: 75 vial; Refill: 11  -     Check Pulse Oximetry while ambulating    Obstructive sleep apnea    Pulmonary hypertension    57-year-old man with a history of likely pulmonary hypertension in the setting obstructive sleep apnea, pulmonary emphysema and chronic hypoxic respiratory failure.  He is slowly improving requiring less oxygen.    Your breathing is healthier and your oxygen requirement is less than it was 1 year ago.  This is good news.    Oxygen Plan  Goal: oxygen saturation 90% or greater.    At rest: no oxygen if you meet this goal    With activity: 2-4 lpm as needed for 90% or greater.    At night: 1 lpm at night through your bipap.    Sleep Plan  Keep wearing your bipap every night.    Exercise Plan  Stay active with regular exercise.    Weight Plan  Aim for slow steady weight loss.    Due to desaturation of SaO2 less than or equal to 88% on room air at rest from emphysema, home oxygen therapy will benefit my patient's condition.  The patient has tried (or considered) other medications with limited success and oxygen is still required. The patient is mobile in the home and requires portability.          Subjective:    Patient ID: Gasper Reddy is a 57 y.o. male.    HPI57-year-old man with a history of emphysema, pulmonary hypertension, obstructive sleep apnea here for follow-up.    Emphysema: This is stable.  He has occasional sputum production but has not had any chest colds or bronchitis.  Provocative factors include cold weather.  Palliative factors include his nebulizers.    Chronic hypoxic respiratory failure: This is improved.  He  is requiring less oxygen than previous.  He is tolerating the oxygen therapy well.  He would like to be on the lowest amount of oxygen possible.    Pulmonary hypertension: Estimated based on echocardiogram.  No swelling of legs or abdomen.    Obstructive sleep apnea: He wears his BiPAP every night.  He wears it for more than 4 hours every night.  It improves the quality of his sleep significantly.        Review of Systems  Review of systems positive for activity change cough wheezing.  The remainder of 14 system review systems is negative.        Objective:    Physical Exam   Constitutional: He is oriented to person, place, and time. He appears well-developed and well-nourished. No distress.   HENT:   Head: Normocephalic.   Nose: Nose normal.   Mouth/Throat: Oropharynx is clear and moist. No oropharyngeal exudate.   Eyes: Pupils are equal, round, and reactive to light. Right eye exhibits no discharge. Left eye exhibits no discharge. No scleral icterus.   Neck: Normal range of motion. No JVD present. No tracheal deviation present. No thyromegaly present.   Cardiovascular: Normal rate and regular rhythm.  Exam reveals no gallop and no friction rub.    No murmur heard.  Pulmonary/Chest: Effort normal and breath sounds normal. No stridor. No respiratory distress. He has no wheezes. He has no rales.   Abdominal: Soft. Bowel sounds are normal. He exhibits no distension. There is no tenderness.   Musculoskeletal: He exhibits no edema or tenderness.   Lymphadenopathy:     He has no cervical adenopathy.   Neurological: He is alert and oriented to person, place, and time. No cranial nerve deficit.   Skin: Skin is warm and dry. No rash noted. He is not diaphoretic. No erythema. No pallor.   Psychiatric: He has a normal mood and affect. His behavior is normal. Judgment and thought content normal.           Current Outpatient Prescriptions on File Prior to Visit   Medication Sig Dispense Refill     albuterol (PROAIR  HFA;PROVENTIL HFA;VENTOLIN HFA) 90 mcg/actuation inhaler Inhale 2 puffs every 4 (four) hours as needed for wheezing. 6.7 g 0     albuterol (PROVENTIL) 2.5 mg /3 mL (0.083 %) nebulizer solution Take 3 mL (2.5 mg total) by nebulization every 4 (four) hours as needed for wheezing. 25 vial 11     aspirin 81 mg TbEF Take 81 mg by mouth once daily.        BLOOD SUGAR DIAGNOSTIC (TRUETEST TEST STRIPS MISC) USE 1 STRIP TWICE DAILY       furosemide (LASIX) 40 MG tablet Take 1 tablet (40 mg total) by mouth 2 (two) times a day. 60 tablet 11     ipratropium-albuterol (DUO-NEB) 0.5-2.5 mg/3 mL nebulizer USE ONE AMPULE IN NEBULIZER 4 TIMES DAILY 360 mL 1     losartan (COZAAR) 25 MG tablet TAKE ONE TABLET BY MOUTH ONCE DAILY 30 tablet 10     metFORMIN (GLUCOPHAGE) 500 MG tablet TAKE ONE TABLET BY MOUTH TWICE DAILY WITH MEALS (Patient taking differently: TAKE ONE TABLET BY MOUTH DAILY WITH MEALS) 180 tablet 1     metoprolol succinate (TOPROL-XL) 25 MG TAKE ONE TABLET BY MOUTH ONCE DAILY 90 tablet 3     naproxen (NAPROSYN) 500 MG tablet 1 tablet daily.       umeclidinium (INCRUSE ELLIPTA) 62.5 mcg/actuation DsDv inhaler Inhale 1 puff daily. (Patient taking differently: Inhale 1 puff as needed. ) 1 each 12     furosemide (LASIX) 40 MG tablet Take 1 tablet (40 mg total) by mouth 2 (two) times a day at 9am and 6pm. 180 tablet 3     No current facility-administered medications on file prior to visit.      /68  Pulse 83  Resp 17  Wt (!) 228 lb (103.4 kg)  SpO2 95% Comment: 4LPM  BMI 33.67 kg/m2    Medical History  Active Ambulatory (Non-Hospital) Problems    Diagnosis     Obstructive sleep apnea     Type 2 diabetes mellitus without complication, without long-term current use of insulin     Chronic respiratory failure with hypoxia     Chronic diastolic congestive heart failure     Essential hypertension with goal blood pressure less than 140/90     Pulmonary hypertension     COPD (chronic obstructive pulmonary disease)      Carpal Tunnel Syndrome     Medial Epicondylitis     Nicotine Dependence     Ileus     Diarrhea     Past Medical History:   Diagnosis Date     COPD (chronic obstructive pulmonary disease)      Diabetes mellitus      Diverticulitis      Diverticulosis      HTN (hypertension)      Hypothyroid      S/P colon resection      Small bowel obstruction         Surgical History  He  has a past surgical history that includes Appendectomy.       Social History  Reviewed, he still living at home.  He is fairly limited in getting out during this weather.   Allergies  No Known Allergies                           Data Review - imaging, labs, and ekgs listed below were reviewed by me.  Chest XRay and chest CT images and EKG tracings interpreted personally.     Past Labs  No visits with results within 2 Month(s) from this visit.  Latest known visit with results is:    Office Visit on 10/17/2017   Component Date Value     Microalbumin, Random Uri* 10/17/2017 <0.50      Creatinine, Urine 10/17/2017 39.9      Microalbumin/Creatinine * 10/17/2017       Sodium 10/17/2017 141      Potassium 10/17/2017 4.2      Chloride 10/17/2017 95*     CO2 10/17/2017 37*     Anion Gap, Calculation 10/17/2017 9      Glucose 10/17/2017 114      BUN 10/17/2017 16      Creatinine 10/17/2017 0.74      GFR MDRD Af Amer 10/17/2017 >60      GFR MDRD Non Af Amer 10/17/2017 >60      Bilirubin, Total 10/17/2017 1.2*     Calcium 10/17/2017 9.6      Protein, Total 10/17/2017 7.7      Albumin 10/17/2017 3.9      Alkaline Phosphatase 10/17/2017 109      AST 10/17/2017 22      ALT 10/17/2017 22      Cholesterol 10/17/2017 193      Triglycerides 10/17/2017 163*     HDL Cholesterol 10/17/2017 45      LDL Calculated 10/17/2017 115      Patient Fasting > 8hrs? 10/17/2017 Yes      Hemoglobin A1c 10/17/2017 5.9

## 2021-06-16 NOTE — PROGRESS NOTES
DME Provider: [facility]  Date Faxed: 2/27/18  Ordering Provider: Dr. Salas  Equipment ordered: Portable oxygen concentrator

## 2021-06-16 NOTE — PROGRESS NOTES
DME Provider: [facility]  Date Faxed: 3/1/18  Ordering Provider: Dr. Salas  Equipment ordered: Portable oxygen concentrator

## 2021-06-16 NOTE — PROGRESS NOTES
Per [facility] they need an ENRIKE signed by patient to transfer DME. Called and talked to patient, information given to patient. Told patient that if it is easier for him to come to clinic to sign an ENRIKE then he is free to do that as well. Patient stated he will come to clinic tomorrow 3/20/18 to sign ENRIKE. ENRIKE is at  with Zena[facility].

## 2021-06-16 NOTE — PROGRESS NOTES
Patient called stating that [facility] will not give him a POC and would like his orders sent to [facility] [place].

## 2021-06-17 NOTE — PROGRESS NOTES
Assessment: /    Plan:    1. Type 2 diabetes mellitus without complication, without long-term current use of insulin  Glycosylated Hemoglobin A1c   2. Mixed hyperlipidemia  LDL Cholesterol, Direct       Consider adding atorvastatin if needed.  Recheck diabetes in 6 months.      Subjective:    HPI:  Marlon Reddy is a 57-year-old male presenting for follow-up on diabetes.  He takes metformin.  He has been riding the exercise bike.  He eats a lot of salads and chicken.  He eats steak once per week, [employer] twice per week and shrimp once per week.    He has not needed oxygen at rest.    LDL was 115 in October.      Review of Systems: No fever or rash.      Current Outpatient Prescriptions   Medication Sig Dispense Refill     albuterol (PROAIR HFA;PROVENTIL HFA;VENTOLIN HFA) 90 mcg/actuation inhaler Inhale 2 puffs every 4 (four) hours as needed for wheezing. 6.7 g 0     albuterol (PROVENTIL) 2.5 mg /3 mL (0.083 %) nebulizer solution Take 3 mL (2.5 mg total) by nebulization every 4 (four) hours as needed for wheezing. 75 vial 11     aspirin 81 mg TbEF Take 81 mg by mouth once daily.        BLOOD SUGAR DIAGNOSTIC (TRUETEST TEST STRIPS MISC) USE 1 STRIP TWICE DAILY       furosemide (LASIX) 40 MG tablet Take 1 tablet (40 mg total) by mouth 2 (two) times a day. 60 tablet 11     losartan (COZAAR) 25 MG tablet TAKE ONE TABLET BY MOUTH ONCE DAILY 30 tablet 10     metFORMIN (GLUCOPHAGE) 500 MG tablet TAKE ONE TABLET BY MOUTH TWICE DAILY WITH MEALS (Patient taking differently: TAKE ONE TABLET BY MOUTH DAILY WITH MEALS) 180 tablet 1     metoprolol succinate (TOPROL-XL) 25 MG TAKE ONE TABLET BY MOUTH ONCE DAILY 90 tablet 3     naproxen (NAPROSYN) 500 MG tablet 1 tablet daily.       umeclidinium (INCRUSE ELLIPTA) 62.5 mcg/actuation DsDv inhaler Inhale 1 puff daily. (Patient taking differently: Inhale 1 puff as needed. ) 1 each 12     furosemide (LASIX) 40 MG tablet Take 1 tablet (40 mg total) by mouth 2 (two) times a day at 9am and  6pm. 180 tablet 3     No current facility-administered medications for this visit.          Objective:    Vitals:    04/17/18 1441   BP: 114/70   Pulse: 84   Resp: 22   Temp: 98  F (36.7  C)   SpO2: 93%       Gen:  NAD, VSS  Lungs:  normal  Heart:  normal          ADDITIONAL HISTORY SUMMARIZED (2): None.  DECISION TO OBTAIN EXTRA INFORMATION (1): None.   RADIOLOGY TESTS (1): None.  LABS (1):  A1c, LDL.  MEDICINE TESTS (1): None.  INDEPENDENT REVIEW (2 each): None.     Total Data Points: 1

## 2021-06-17 NOTE — PROGRESS NOTES
Called and talk to patient. Information given to patient regarding update on oxygen order. Told patient should only ask to talk to Alyse at [facility]. Told patient if Alyse does not contact him by end of today to give me a call tomorrow and I can call again for update.

## 2021-06-17 NOTE — PROGRESS NOTES
Call from Gasper.  Stated that he recently transferred his oxygen from [facility] to [facility].  Would like orders sent to [facility] for his CPAP as well so he can get his supplies through them.   Most recent orders (4-) faxed to [facility].

## 2021-06-17 NOTE — PROGRESS NOTES
Patient called stating he is having issues with [facility] and not getting any information regarding status of oxygen supplies. Told him I will call [facility] and see what the update is and will call him back with info.

## 2021-06-17 NOTE — PROGRESS NOTES
Tried calling [facility] x 2 for Alyse, both times she was not able to talk or call me back. Will try again at a later time. If not able to get a hold of Alyse, I will have to talk to manager Jessica. Called and talked to Gasper and update given to him. He verbalized understanding.

## 2021-06-17 NOTE — PROGRESS NOTES
Alyse from [facility] called stating she yet did not received paper work. Fax number confirmed and is correct but not sure where the paper went. Asked to refax paper work to [phone].

## 2021-06-17 NOTE — PROGRESS NOTES
Called and talked to Alyse at [facility] and she asked me to fax her a copy of oxygen order to her at [phone]. Fax sent. Will call patient to let him know update.

## 2021-06-17 NOTE — PROGRESS NOTES
Called and talked to Alyse at [facility], she stated she still did not get the paper work. I asked to verify fax number to her office and she gave me wrong fax last time. Fax number is [phone]. Paper re-faxed to [facility].

## 2021-06-19 NOTE — LETTER
Letter by Dustin Mariano MD at      Author: Dustin Mariano MD Service: -- Author Type: --    Filed:  Encounter Date: 6/20/2019 Status: (Other)         Gasper MATTIE Monterrosopresley  [address] [facility] [address]             June 20, 2019         Hi Marlon,    Below are the results from your visit:  Your A1c is very good.    Resulted Orders   Glycosylated Hemoglobin A1c   Result Value Ref Range    Hemoglobin A1c 5.7 3.5 - 6.0 %         Please call with questions or contact us using [employer].    Sincerely,        Electronically signed by Dustin Mariano MD

## 2021-06-19 NOTE — LETTER
Letter by Rubina Hook RN at      Author: Rubina Hook RN Service: -- Author Type: --    Filed:  Encounter Date: 5/1/2019 Status: (Other)       [facility]  [address] [address]        Gasper MATTIE Maureen  [address] [facility] [address]    Dear Gasper    We have reviewed the Health Care Directive dated 10-25-18 which you presented for addition to   your medical record. Unfortunately, our review indicates the document is not legally valid for   the following reason(s): The document has been altered.  In order to create a legal document you will need to create a new document. Per state law if you want to make changes to your health care agents or medical instructions you must create a new document.    We greatly value the opportunity to assist you in documenting your choices and to honor your   wishes. We apologize for any inconvenience. We have several options to assist you in updating   your document so it meets legal requirements.       Health Care Directives and Advance Care Planning resources can be viewed and printed   for free at our web site:[email or web address].       COPIES of completed Health Care Directives can be brought or mailed to any of our   locations, including the address listed below. You can also email a copy to [email or web address] .       For additional assistance or questions you can email us at [email or web address] or call [phone]    Sincerely,     [facility]  [address] [address]   Email us: [email or web address] Call us: [phone]  Visit at: [email or web address]

## 2021-06-20 NOTE — LETTER
Letter by Dustin Mariano MD at      Author: Dustin Mariano MD Service: -- Author Type: --    Filed:  Encounter Date: 3/29/2020 Status: (Other)         Gasper Reddy  [address] [facility] [address]             March 29, 2020         Aram Mason,    Kate are the results from your visit:  Your lab results are stable.    Resulted Orders   Lipid [place] FASTING   Result Value Ref Range    Cholesterol 165 <=199 mg/dL    Triglycerides 62 <=149 mg/dL    HDL Cholesterol 45 >=40 mg/dL    LDL Calculated 108 <=129 mg/dL    Patient Fasting > 8hrs? Yes    Microalbumin, Random Urine   Result Value Ref Range    Microalbumin, Random Urine 1.47 0.00 - 1.99 mg/dL    Creatinine, Urine 208.0 mg/dL    Microalbumin/Creatinine Ratio Random Urine 7.1 <=19.9 mg/g    Narrative    Microalbumin, Random Urine  <2.0 mg/dL . . . . . . . . Normal  3.0-30.0 mg/dL . . . . . . Microalbuminuria  >30.0 mg/dL . . . . . .  . Clinical Proteinuria    Microalbumin/Creatinine Ratio, Random Urine  <20 mg/g . . . . .. . . . Normal   mg/g . . . . . . . Microalbuminuria  >300 mg/g . . . . . . . . Clinical Proteinuria       Glycosylated Hemoglobin A1c   Result Value Ref Range    Hemoglobin A1c 5.6 3.5 - 6.0 %         Please call with questions or contact us using [id number].    Sincerely,        Electronically signed by Dustin Mariano MD

## 2021-06-21 NOTE — PROGRESS NOTES
Assessment: /    Plan:    1. Type 2 diabetes mellitus without complication, without long-term current use of insulin (H)  Microalbumin, Random Urine    Glycosylated Hemoglobin A1c   2. Mixed hyperlipidemia  Lipid Cascade FASTING   3. Essential hypertension with goal blood pressure less than 140/90  Comprehensive Metabolic Panel   4. Supraspinatus tendon tear, left, subsequent encounter     5. Need for vaccination  Influenza, Seasonal,Quad Inj, 36+ MOS   6. Screen for colon cancer  Cologuard       He will schedule orthopedic appointment with Dr. Grady.    If he develops dizziness, and his blood pressure is lower, we will stop metoprolol.    He agrees to do Cologuard.    Recheck in 6 months.    He will use Debrox for earwax, beginning in 2 weeks.    This was a 37 minute visit with >50% of the time spent in face-to-face counseling and coordination of care regarding: Diabetes, hyperlipidemia, hypertension, supraspinatus tendon tear.      Subjective:    HPI:  Marlon Reddy is a 58-year-old male presenting for follow-up on diabetes.  He takes metformin.  He will have an appointment at [facility] in January.    He has left shoulder pain.  He previously saw Dr. Grady regarding this and had an injection.  He had a supraspinatus tendon tear.      He has a mole behind the right ear which has not been changing.    His left ear is clogged.      Review of Systems: No fever or sputum production.      Current Outpatient Prescriptions   Medication Sig Dispense Refill     albuterol (PROAIR HFA;PROVENTIL HFA;VENTOLIN HFA) 90 mcg/actuation inhaler Inhale 2 puffs every 4 (four) hours as needed for wheezing. 6.7 g 0     albuterol (PROVENTIL) 2.5 mg /3 mL (0.083 %) nebulizer solution Take 3 mL (2.5 mg total) by nebulization every 4 (four) hours as needed for wheezing. 75 vial 11     aspirin 81 mg TbEF Take 81 mg by mouth once daily.        BLOOD SUGAR DIAGNOSTIC (TRUETEST TEST STRIPS MISC) USE 1 STRIP TWICE DAILY        furosemide (LASIX) 40 MG tablet Take 1 tablet (40 mg total) by mouth 2 (two) times a day. 60 tablet 11     INCRUSE ELLIPTA 62.5 mcg/actuation DsDv inhaler INHALE ONE PUFF BY MOUTH ONCE DAILY 30 each 6     losartan (COZAAR) 25 MG tablet TAKE ONE TABLET BY MOUTH ONCE DAILY 30 tablet 10     metFORMIN (GLUCOPHAGE) 500 MG tablet TAKE ONE TABLET BY MOUTH TWICE DAILY WITH MEALS (Patient taking differently: TAKE ONE TABLET BY MOUTH DAILY WITH MEALS) 180 tablet 1     metoprolol succinate (TOPROL-XL) 25 MG TAKE ONE TABLET BY MOUTH ONCE DAILY 90 tablet 3     naproxen (NAPROSYN) 500 MG tablet 1 tablet daily.       No current facility-administered medications for this visit.          Objective:    Vitals:    10/19/18 1307   BP: 116/60   Pulse: 88   Resp: 20   Temp: 98.2  F (36.8  C)   SpO2: 93%       Gen:  NAD, VSS  He is wearing a cannula for oxygen.  Ears with cerumen impaction on the left.  Repeated irrigation was able to remove only a small amount.  Wax is near the TM.  Lungs:  normal  Heart:  normal  Feet with no ulcer, normal monofilament testing  Skin with a 5 mm soft, raised mole behind the right ear        ADDITIONAL HISTORY SUMMARIZED (2): None.  DECISION TO OBTAIN EXTRA INFORMATION (1): None.   RADIOLOGY TESTS (1): None.  LABS (1): A1c, CMP, lipids.  MEDICINE TESTS (1): None.  INDEPENDENT REVIEW (2 each): None.     Total Data Points: 1

## 2021-06-23 NOTE — TELEPHONE ENCOUNTER
Refill Approved    Rx renewed per Medication Renewal Policy. Medication was last renewed on 1/31/18  Last OV:  10/19/18    Larissa Syed, [facility] Triage/Med Refill 1/23/2019     Requested Prescriptions   Pending Prescriptions Disp Refills     losartan (COZAAR) 25 MG tablet [Pharmacy Med Name: LOSARTAN 25MG   TAB] 90 tablet 2     Sig: Take 1 tablet (25 mg total) by mouth daily.    Angiotensin Receptor Blocker Protocol Passed - 1/21/2019 11:02 AM       Passed - PCP or prescribing provider visit in past 12 months      Last office visit with prescriber/PCP: 10/19/2018 Dustin Mariano MD OR same dept: 10/19/2018 Dustin Mariano MD OR same specialty: 10/19/2018 Dustin Mariano MD  Last physical: Visit date not found Last MTM visit: Visit date not found   Next visit within 3 mo: Visit date not found  Next physical within 3 mo: Visit date not found  Prescriber OR PCP: Dustin Mariano MD  Last diagnosis associated with med order: 1. Essential hypertension with goal blood pressure less than 140/90  - losartan (COZAAR) 25 MG tablet [Pharmacy Med Name: LOSARTAN 25MG   TAB]; TAKE ONE TABLET BY MOUTH ONCE DAILY  Dispense: 30 tablet; Refill: 10    If protocol passes may refill for 12 months if within 3 months of last provider visit (or a total of 15 months).            Passed - Serum potassium within the past 12 months    Lab Results   Component Value Date    Potassium 4.2 10/19/2018            Passed - Blood pressure filed in past 12 months    BP Readings from Last 1 Encounters:   10/19/18 116/60            Passed - Serum creatinine within the past 12 months    Creatinine   Date Value Ref Range Status   10/19/2018 0.74 0.70 - 1.30 mg/dL Final

## 2021-06-24 NOTE — TELEPHONE ENCOUNTER
Patient Returning Call  Reason for call:  Return call  Information relayed to patient:  Not sure what to tell patient. Patient does need refills. His last furosemide Rx was filled on 1/29/2018.   Patient has additional questions:  Yes  If YES, what are your questions/concerns:  Please call patient back if he can get a refill until his appointment on 4/19/19.  Okay to leave a detailed message?: Yes

## 2021-06-24 NOTE — TELEPHONE ENCOUNTER
Pt last fill was 1/29/19 with 11 refills.  He should have enough to last him through 2/28.  IF pt is taking correctly.      Call placed to pt to relay above.      Will call [facility] when opens at 9 to check if med is covered at current dose and times. Thanks.

## 2021-06-24 NOTE — TELEPHONE ENCOUNTER
Medication Question or Clarification  Who is calling: Patient  What medication are you calling about?: Furosemide   What dose do you take?: 40 mg tab   How often are you taking the medication?: two times a day   Who prescribed the medication?: Dustin Mariano MD  What is your question/concern?: Patient is out of medication, OV is schedule for April, requesting a bridge as his previous request was denied.   Pharmacy: [facility] pharmacy   Okay to leave a detailed message?: Yes  Site CMT - Please call the pharmacy to obtain any additional needed information.

## 2021-07-03 NOTE — ADDENDUM NOTE
Addendum Note by Abigail Reeder RN at 11/25/2020  9:00 AM     Author: Abigail Reeder RN Service: -- Author Type: Registered Nurse    Filed: 11/25/2020 10:25 AM Encounter Date: 11/25/2020 Status: Signed    : Abigail Reeder RN (Registered Nurse)    Addended by: ABIGAIL REEDER on: 11/25/2020 10:25 AM        Modules accepted: Orders

## 2021-07-14 PROBLEM — J96.00 ACUTE RESPIRATORY FAILURE (H): Status: RESOLVED | Noted: 2019-09-02 | Resolved: 2021-01-01

## 2022-02-19 NOTE — PROGRESS NOTES
Assessment: /    Plan:    1. Type 2 diabetes mellitus without complication, without long-term current use of insulin  Microalbumin, Random Urine    Lipid Cascade FASTING    Glycosylated Hemoglobin A1c   2. Essential hypertension with goal blood pressure less than 140/90  Comprehensive Metabolic Panel   3. Need for immunization against influenza  Influenza, Seasonal,Quad Inj, 36+ MOS       Recheck in 6 months, or sooner if any problems.      Subjective:    HPI:  Marlon Reddy is a 57-year-old male presenting for follow-up on diabetes.  Fingerstick glucose readings have been 110-120 in the mornings.  He has been riding an exercise bike 4 times per day for 2 minutes, beginning one month ago.  He has been taking metformin 1 or 2 per day.       Review of Systems: No fever or chest pain.  He is fasting today.      Current Outpatient Prescriptions   Medication Sig Dispense Refill     albuterol (PROAIR HFA) 90 mcg/actuation inhaler Inhale 2 puffs every 4 (four) hours as needed. 18 g 11     albuterol (PROAIR HFA;PROVENTIL HFA;VENTOLIN HFA) 90 mcg/actuation inhaler Inhale 2 puffs every 4 (four) hours as needed for wheezing. 6.7 g 0     albuterol (PROVENTIL) 2.5 mg /3 mL (0.083 %) nebulizer solution Take 3 mL (2.5 mg total) by nebulization every 4 (four) hours as needed for wheezing. 25 vial 11     aspirin 81 mg TbEF Take 81 mg by mouth once daily.        BLOOD SUGAR DIAGNOSTIC (TRUETEST TEST STRIPS MISC) USE 1 STRIP TWICE DAILY       furosemide (LASIX) 40 MG tablet        ipratropium-albuterol (DUO-NEB) 0.5-2.5 mg/3 mL nebulizer USE ONE VIAL IN NEBULIZER 4 TIMES DAILY 360 vial 1     losartan (COZAAR) 25 MG tablet Take 1 tablet (25 mg total) by mouth daily. 30 tablet 11     metFORMIN (GLUCOPHAGE) 500 MG tablet TAKE ONE TABLET BY MOUTH TWICE DAILY WITH MEALS 180 tablet 1     metoprolol succinate (TOPROL-XL) 25 MG Take 1 tablet (25 mg total) by mouth daily. 90 tablet 3     naproxen (NAPROSYN) 500 MG tablet 1 tablet daily.        umeclidinium (INCRUSE ELLIPTA) 62.5 mcg/actuation DsDv inhaler Inhale 1 puff daily. 1 each 12     No current facility-administered medications for this visit.          Objective:    Vitals:    10/17/17 1415   BP: 106/64   Pulse: 90   Resp: 20   Temp: 98.3  F (36.8  C)   SpO2: 94%       Gen:  NAD, VSS  Lungs:  normal  Heart:  normal  Feet with normal monofilament testing, no ulcers        ADDITIONAL HISTORY SUMMARIZED (2): None.  DECISION TO OBTAIN EXTRA INFORMATION (1): None.   RADIOLOGY TESTS (1): None.  LABS (1): Ordered.  MEDICINE TESTS (1): None.  INDEPENDENT REVIEW (2 each): None.     Total Data Points: 1     PERRL detailed exam

## 2108-04-10 ENCOUNTER — RECORDS - HEALTHEAST (OUTPATIENT)
Dept: ADMINISTRATIVE | Facility: OTHER | Age: OVER 89
End: 2108-04-10